# Patient Record
Sex: MALE | Race: WHITE | Employment: OTHER | URBAN - METROPOLITAN AREA
[De-identification: names, ages, dates, MRNs, and addresses within clinical notes are randomized per-mention and may not be internally consistent; named-entity substitution may affect disease eponyms.]

---

## 2017-01-09 ENCOUNTER — OFFICE VISIT (OUTPATIENT)
Dept: INTERNAL MEDICINE CLINIC | Age: 62
End: 2017-01-09

## 2017-01-09 VITALS
BODY MASS INDEX: 33.04 KG/M2 | HEART RATE: 97 BPM | DIASTOLIC BLOOD PRESSURE: 83 MMHG | SYSTOLIC BLOOD PRESSURE: 134 MMHG | RESPIRATION RATE: 18 BRPM | OXYGEN SATURATION: 97 % | HEIGHT: 71 IN | TEMPERATURE: 97.6 F | WEIGHT: 236 LBS

## 2017-01-09 DIAGNOSIS — I10 ESSENTIAL HYPERTENSION: Primary | ICD-10-CM

## 2017-01-09 DIAGNOSIS — K63.5 COLON POLYPS: ICD-10-CM

## 2017-01-09 DIAGNOSIS — Z23 ENCOUNTER FOR IMMUNIZATION: ICD-10-CM

## 2017-01-09 DIAGNOSIS — Z87.81 HISTORY OF FRACTURE OF RIGHT ANKLE: ICD-10-CM

## 2017-01-09 DIAGNOSIS — E66.9 OBESITY (BMI 30.0-34.9): ICD-10-CM

## 2017-01-09 DIAGNOSIS — F17.200 SMOKER: ICD-10-CM

## 2017-01-09 DIAGNOSIS — E78.00 HYPERCHOLESTEROLEMIA: ICD-10-CM

## 2017-01-09 DIAGNOSIS — E03.9 ACQUIRED HYPOTHYROIDISM: ICD-10-CM

## 2017-01-09 RX ORDER — FELODIPINE 5 MG/1
5 TABLET, EXTENDED RELEASE ORAL DAILY
Qty: 90 TAB | Refills: 3 | Status: SHIPPED | OUTPATIENT
Start: 2017-01-09 | End: 2017-12-22 | Stop reason: SDUPTHER

## 2017-01-09 RX ORDER — LEVOTHYROXINE SODIUM 150 UG/1
150 TABLET ORAL
Qty: 90 TAB | Refills: 3 | Status: SHIPPED | OUTPATIENT
Start: 2017-01-09 | End: 2017-12-22 | Stop reason: SDUPTHER

## 2017-01-09 RX ORDER — SIMVASTATIN 40 MG/1
40 TABLET, FILM COATED ORAL DAILY
Qty: 90 TAB | Refills: 3 | Status: SHIPPED | OUTPATIENT
Start: 2017-01-09 | End: 2017-12-22 | Stop reason: SDUPTHER

## 2017-01-09 NOTE — PROGRESS NOTES
Reviewed record in preparation for visit and have obtained necessary documentation. Identified pt with two pt identifiers(name and ). Health Maintenance Due   Topic    Hepatitis C Screening     Pneumococcal 19-64 Medium Risk (1 of 1 - PPSV23)    DTaP/Tdap/Td series (1 - Tdap)    FOBT Q 1 YEAR AGE 50-75     ZOSTER VACCINE AGE 60>     INFLUENZA AGE 9 TO ADULT          No chief complaint on file. Learning Assessment:  :     No flowsheet data found. Depression Screening:  :     PHQ 2 / 9, over the last two weeks 2017   Little interest or pleasure in doing things Not at all   Feeling down, depressed or hopeless Not at all   Total Score PHQ 2 0       Fall Risk Assessment:  :     No flowsheet data found. Abuse Screening:  :     No flowsheet data found. Coordination of Care Questionnaire:  :     1) Have you been to an emergency room, urgent care clinic since your last visit? NO   Hospitalized since your last visit? no             2) Have you seen or consulted any other health care providers outside of [address] [place] [address] since your last visit? no  (Include any pap smears or colon screenings in this section.)    3) Do you have an Advance Directive on file? no    4) Are you interested in receiving information on Advance Directives? YES      Patient is accompanied by patient I have received verbal consent from [facility] to discuss any/all medical information while they are present in the room.

## 2017-01-09 NOTE — LETTER
1/17/2017 12:03 PM 
 
Mr. Merly Maxwell [address] [id number] Dear Merly Maxwell: 
 
Please find your most recent results below. Resulted Orders CBC W/O DIFF Result Value Ref Range WBC 8.1 3.4 - 10.8 x10E3/uL  
 RBC 5.41 4.14 - 5.80 x10E6/uL HGB 16.6 12.6 - 17.7 g/dL HCT 48.1 37.5 - 51.0 % MCV 89 79 - 97 fL  
 MCH 30.7 26.6 - 33.0 pg  
 MCHC 34.5 31.5 - 35.7 g/dL  
 RDW 13.9 12.3 - 15.4 % PLATELET 195 911 - 465 x10E3/uL Narrative Performed at:  [address]  [id number] : Nathaniel Gar MD, Phone:  [phone] LIPID PANEL Result Value Ref Range Cholesterol, total 163 100 - 199 mg/dL Triglyceride 140 0 - 149 mg/dL HDL Cholesterol 37 (L) >39 mg/dL VLDL, calculated 28 5 - 40 mg/dL LDL, calculated 98 0 - 99 mg/dL Narrative Performed at:  [address]  [id number] : Nathaniel Gar MD, Phone:  [phone] METABOLIC PANEL, COMPREHENSIVE Result Value Ref Range Glucose 115 (H) 65 - 99 mg/dL BUN 15 8 - 27 mg/dL Creatinine 0.96 0.76 - 1.27 mg/dL GFR est non-AA 84 >59 mL/min/1.73 GFR est AA 97 >59 mL/min/1.73  
 BUN/Creatinine ratio 16 10 - 22 Sodium 141 134 - 144 mmol/L Potassium 4.7 3.5 - 5.2 mmol/L Chloride 102 96 - 106 mmol/L  
 CO2 21 18 - 29 mmol/L Calcium 9.2 8.6 - 10.2 mg/dL Protein, total 7.1 6.0 - 8.5 g/dL Albumin 4.6 3.6 - 4.8 g/dL GLOBULIN, TOTAL 2.5 1.5 - 4.5 g/dL A-G Ratio 1.8 1.1 - 2.5 Bilirubin, total 0.4 0.0 - 1.2 mg/dL Alk. phosphatase 60 39 - 117 IU/L  
 AST 21 0 - 40 IU/L  
 ALT 16 0 - 44 IU/L Narrative Performed at:  [address]  [id number] : Nathaniel Gar MD, Phone:  [phone] TSH 3RD GENERATION Result Value Ref Range TSH 0.771 0.450 - 4.500 uIU/mL Narrative Performed at:  [address]  [id number] : Kary Mosley MD, Phone:  [phone] PSA DIAGNOSTIC (PROSTATIC SPECIFIC AG) Result Value Ref Range Prostate Specific Ag 1.0 0.0 - 4.0 ng/mL Comment:  
   Roche ECLIA methodology. According to the American Urological Association, Serum PSA should 
decrease and remain at undetectable levels after radical 
prostatectomy. The AUA defines biochemical recurrence as an initial 
PSA value 0.2 ng/mL or greater followed by a subsequent confirmatory PSA value 0.2 ng/mL or greater. Values obtained with different assay methods or kits cannot be used 
interchangeably. Results cannot be interpreted as absolute evidence 
of the presence or absence of malignant disease. Narrative Performed at:  [address]  [id number] : Kary Mosley MD, Phone:  [phone] CVD REPORT Result Value Ref Range INTERPRETATION Note Comment:  
   Supplement report is available. Narrative Performed at:  [address] [place] [address] [address] [place] [address]  [id number] : Diallo Skelton PhD, Phone:  [phone] RECOMMENDATIONS: 
 
 
High blood sugar -- watch sweets/carbs/starchy food Recheck Fasting blood sugar and A1c Otherwise ok  
    
   
 
 
 
 
Please call me if you have any questions: [phone] Sincerely, 
 
 
Balaji Pacheco, DO

## 2017-01-09 NOTE — MR AVS SNAPSHOT
Visit Information Date & Time Provider Department Dept. Phone Encounter #  
 1/9/2017  9:00 AM Tank Kennedy, [address] [facility] Internal Medicine [phone] [id number] Follow-up Instructions Return in about 6 months (around 7/9/2017). Upcoming Health Maintenance Date Due Hepatitis C Screening 1955 Pneumococcal 19-64 Medium Risk (1 of 1 - PPSV23) 5/26/1972 DTaP/Tdap/Td series (1 - Tdap) 5/26/1974 FOBT Q 1 YEAR AGE 50-75 5/26/2003 ZOSTER VACCINE AGE 60> 5/26/2013 INFLUENZA AGE 9 TO ADULT 8/1/2016 Allergies as of 1/9/2017  Review Complete On: 1/9/2017 By: Tank Kennedy, DO No Known Allergies Current Immunizations  Reviewed on 1/9/2017 Name Date Influenza Vaccine 10/15/2016 Reviewed by Gracie Sims on 1/9/2017 at  8:57 AM  
You Were Diagnosed With   
  
 Codes Comments Essential hypertension    -  Primary ICD-10-CM: I10 
ICD-9-CM: 401.9 Hypercholesterolemia     ICD-10-CM: E78.00 ICD-9-CM: 272.0 Acquired hypothyroidism     ICD-10-CM: E03.9 ICD-9-CM: 244.9 Obesity (BMI 30.0-34.9)     ICD-10-CM: J64.3 ICD-9-CM: 278.00 Colon polyps     ICD-10-CM: K63.5 ICD-9-CM: 211.3 Smoker     ICD-10-CM: L35.191 ICD-9-CM: 305.1 History of fracture of right ankle     ICD-10-CM: Z87.81 ICD-9-CM: V15.51 Encounter for immunization     ICD-10-CM: A83 ICD-9-CM: V03.89 Vitals BP Pulse Temp Resp Height(growth percentile) Weight(growth percentile) 134/83 (BP 1 Location: Right arm, BP Patient Position: Sitting) 97 97.6 °F (36.4 °C) (Oral) 18 5' 11\" (1.803 m) 236 lb (107 kg) SpO2 BMI Smoking Status 97% 32.92 kg/m2 Current Every Day Smoker Vitals History BMI and BSA Data Body Mass Index Body Surface Area  
 32.92 kg/m 2 2.32 m 2 Preferred Pharmacy Pharmacy Name Phone [address] [facility] [phone] Your Updated Medication List  
  
   
This list is accurate as of: 1/9/17  9:26 AM.  Always use your most recent med list.  
  
  
  
  
 felodipine 5 mg 24 hr tablet Commonly known as:  PLENDIL SR Take 1 Tab by mouth daily. Felodipine ER. Takes one po daily  
  
 levothyroxine 150 mcg tablet Commonly known as:  SYNTHROID Take 1 Tab by mouth Daily (before breakfast). simvastatin 40 mg tablet Commonly known as:  ZOCOR Take 1 Tab by mouth daily. Prescriptions Sent to Pharmacy Refills  
 simvastatin (ZOCOR) 40 mg tablet 3 Sig: Take 1 Tab by mouth daily. Class: Normal  
 Pharmacy: [address], [address] Ph #: [phone] Route: Oral  
 felodipine (PLENDIL SR) 5 mg 24 hr tablet 3 Sig: Take 1 Tab by mouth daily. Felodipine ER. Takes one po daily Class: Normal  
 Pharmacy: [address], [address] Ph #: [phone] Route: Oral  
 levothyroxine (SYNTHROID) 150 mcg tablet 3 Sig: Take 1 Tab by mouth Daily (before breakfast). Class: Normal  
 Pharmacy: [address], [address] Ph #: [phone] Route: Oral  
  
We Performed the Following CBC W/O DIFF [53483 CPT(R)] LIPID PANEL [33056 CPT(R)] METABOLIC PANEL, COMPREHENSIVE [04721 CPT(R)] PSA DIAGNOSTIC (PROSTATIC SPECIFIC AG) [id number] CPT(R)] TSH 3RD GENERATION [45472 CPT(R)] Follow-up Instructions Return in about 6 months (around 7/9/2017). Introducing [facility] & HEALTH SERVICES! [employer] introduces iCAD patient portal. Now you can access parts of your medical record, email your doctor's office, and request medication refills online. 1. In your internet browser, go to [email or web address] 2. Click on the First Time User? Click Here link in the Sign In box. You will see the New Member Sign Up page. 3. Enter your [employer] Access Code exactly as it appears below. You will not need to use this code after youve completed the sign-up process. If you do not sign up before the expiration date, you must request a new code. · [employer] Access Code: [email or web address] Expires: 4/9/2017  9:14 AM 
 
4. Enter the last four digits of your Social Security Number (xxxx) and Date of Birth (mm/dd/yyyy) as indicated and click Submit. You will be taken to the next sign-up page. 5. Create a [employer] ID. This will be your [employer] login ID and cannot be changed, so think of one that is secure and easy to remember. 6. Create a [employer] password. You can change your password at any time. 7. Enter your Password Reset Question and Answer. This can be used at a later time if you forget your password. 8. Enter your e-mail address. You will receive e-mail notification when new information is available in [address]. 9. Click Sign Up. You can now view and download portions of your medical record. 10. Click the Download Summary menu link to download a portable copy of your medical information. If you have questions, please visit the Frequently Asked Questions section of the [employer] website. Remember, [employer] is NOT to be used for urgent needs. For medical emergencies, dial 911. Now available from your iPhone and Android! Please provide this summary of care documentation to your next provider. Your primary care clinician is listed as Ifeoma Paula. If you have any questions after today's visit, please call [phone].

## 2017-01-09 NOTE — PROGRESS NOTES
HISTORY OF PRESENT ILLNESS  Chris Quiros is a 59 y.o. male. New pt. Has a few chronic medical problems and on a few meds as documented and reviewed with pt. Overall feels ok. Has chronic R ankle pain from an old fracture. Reports compliance with meds. Tries to be active physically but has gained wt. Recent colonoscopy showed 5 benign polyps. Smokes 1 PPD. Social ETOH. . 1 grown child. Due for labs. Needs med refills. No other new c/o. Establish Care   Pertinent negatives include no chest pain, no abdominal pain, no headaches and no shortness of breath. Hypertension    Pertinent negatives include no chest pain, no malaise/fatigue, no blurred vision, no headaches, no dizziness and no shortness of breath. Cholesterol Problem   Pertinent negatives include no chest pain, no abdominal pain, no headaches and no shortness of breath. Review of Systems   Constitutional: Negative for fever, malaise/fatigue and weight loss. HENT: Negative for congestion. Eyes: Negative for blurred vision. Respiratory: Negative for cough and shortness of breath. Cardiovascular: Negative for chest pain and leg swelling. Gastrointestinal: Negative for abdominal pain and heartburn. Genitourinary: Negative for dysuria and frequency. Musculoskeletal: Negative for back pain and joint pain. Skin: Negative for rash. Neurological: Negative for dizziness, sensory change and headaches. Psychiatric/Behavioral: Negative for depression. The patient is not nervous/anxious and does not have insomnia. All other systems reviewed and are negative. Physical Exam   Constitutional: He is oriented to person, place, and time. He appears well-developed and well-nourished. No distress. Pleasant  Obese     HENT:   Head: Normocephalic and atraumatic. Mouth/Throat: Oropharynx is clear and moist.   Eyes: Conjunctivae and EOM are normal. Pupils are equal, round, and reactive to light. No scleral icterus.    Neck: Normal range of motion. Neck supple. No JVD present. No thyromegaly present. Cardiovascular: Normal rate, regular rhythm, normal heart sounds and intact distal pulses. No murmur heard. Pulmonary/Chest: Effort normal and breath sounds normal. No respiratory distress. He has no wheezes. He has no rales. Abdominal: Soft. Bowel sounds are normal. He exhibits no distension. There is no tenderness. obese   Musculoskeletal: He exhibits no edema or tenderness. Lymphadenopathy:     He has no cervical adenopathy. Neurological: He is alert and oriented to person, place, and time. No cranial nerve deficit. Coordination normal.   Skin: Skin is warm and dry. No rash noted. Psychiatric: He has a normal mood and affect. His behavior is normal.   Nursing note and vitals reviewed. ASSESSMENT and PLAN  Dave Kennedy was seen today for establish care, hypertension, hypothyroidism and cholesterol problem. Diagnoses and all orders for this visit:    Essential hypertension  -     CBC W/O DIFF  -     METABOLIC PANEL, COMPREHENSIVE  -     PSA - PROSTATE SPECIFIC AG    Hypercholesterolemia  -     LIPID PANEL  -     METABOLIC PANEL, COMPREHENSIVE    Acquired hypothyroidism  -     TSH 3RD GENERATION  -     PSA - PROSTATE SPECIFIC AG    Obesity (BMI 30.0-34. 9)    Colon polyps    Smoker    History of fracture of right ankle    Encounter for immunization  -     PNEUMOCOCCAL POLYSACCHARIDE VACCINE, 23-VALENT, ADULT OR IMMUNOSUPPRESSED PT DOSE,    Other orders  -     simvastatin (ZOCOR) 40 mg tablet; Take 1 Tab by mouth daily. -     felodipine (PLENDIL SR) 5 mg 24 hr tablet; Take 1 Tab by mouth daily. Felodipine ER. Takes one po daily  -     levothyroxine (SYNTHROID) 150 mcg tablet; Take 1 Tab by mouth Daily (before breakfast). Follow-up Disposition:  Return in about 6 months (around 7/9/2017).   lab results and schedule of future lab studies reviewed with patient  reviewed diet, exercise and weight control  very strongly urged to quit smoking to reduce cardiovascular risk  cardiovascular risk and specific lipid/LDL goals reviewed  reviewed medications and side effects in detail  use of aspirin to prevent MI and TIA's discussed  Overall stable

## 2017-01-14 LAB
ALBUMIN SERPL-MCNC: 4.6 G/DL (ref 3.6–4.8)
ALBUMIN/GLOB SERPL: 1.8 {RATIO} (ref 1.1–2.5)
ALP SERPL-CCNC: 60 IU/L (ref 39–117)
ALT SERPL-CCNC: 16 IU/L (ref 0–44)
AST SERPL-CCNC: 21 IU/L (ref 0–40)
BILIRUB SERPL-MCNC: 0.4 MG/DL (ref 0–1.2)
BUN SERPL-MCNC: 15 MG/DL (ref 8–27)
BUN/CREAT SERPL: 16 (ref 10–22)
CALCIUM SERPL-MCNC: 9.2 MG/DL (ref 8.6–10.2)
CHLORIDE SERPL-SCNC: 102 MMOL/L (ref 96–106)
CHOLEST SERPL-MCNC: 163 MG/DL (ref 100–199)
CO2 SERPL-SCNC: 21 MMOL/L (ref 18–29)
CREAT SERPL-MCNC: 0.96 MG/DL (ref 0.76–1.27)
ERYTHROCYTE [DISTWIDTH] IN BLOOD BY AUTOMATED COUNT: 13.9 % (ref 12.3–15.4)
GLOBULIN SER CALC-MCNC: 2.5 G/DL (ref 1.5–4.5)
GLUCOSE SERPL-MCNC: 115 MG/DL (ref 65–99)
HCT VFR BLD AUTO: 48.1 % (ref 37.5–51)
HDLC SERPL-MCNC: 37 MG/DL
HGB BLD-MCNC: 16.6 G/DL (ref 12.6–17.7)
INTERPRETATION, 910389: NORMAL
LDLC SERPL CALC-MCNC: 98 MG/DL (ref 0–99)
MCH RBC QN AUTO: 30.7 PG (ref 26.6–33)
MCHC RBC AUTO-ENTMCNC: 34.5 G/DL (ref 31.5–35.7)
MCV RBC AUTO: 89 FL (ref 79–97)
PLATELET # BLD AUTO: 213 X10E3/UL (ref 150–379)
POTASSIUM SERPL-SCNC: 4.7 MMOL/L (ref 3.5–5.2)
PROT SERPL-MCNC: 7.1 G/DL (ref 6–8.5)
PSA SERPL-MCNC: 1 NG/ML (ref 0–4)
RBC # BLD AUTO: 5.41 X10E6/UL (ref 4.14–5.8)
SODIUM SERPL-SCNC: 141 MMOL/L (ref 134–144)
TRIGL SERPL-MCNC: 140 MG/DL (ref 0–149)
TSH SERPL DL<=0.005 MIU/L-ACNC: 0.77 UIU/ML (ref 0.45–4.5)
VLDLC SERPL CALC-MCNC: 28 MG/DL (ref 5–40)
WBC # BLD AUTO: 8.1 X10E3/UL (ref 3.4–10.8)

## 2017-03-15 ENCOUNTER — OFFICE VISIT (OUTPATIENT)
Dept: INTERNAL MEDICINE CLINIC | Age: 62
End: 2017-03-15

## 2017-03-15 VITALS
HEIGHT: 71 IN | OXYGEN SATURATION: 95 % | DIASTOLIC BLOOD PRESSURE: 85 MMHG | HEART RATE: 78 BPM | BODY MASS INDEX: 31.36 KG/M2 | SYSTOLIC BLOOD PRESSURE: 126 MMHG | WEIGHT: 224 LBS | RESPIRATION RATE: 20 BRPM | TEMPERATURE: 97 F

## 2017-03-15 DIAGNOSIS — C44.91 BASAL CELL CARCINOMA: Primary | ICD-10-CM

## 2017-03-15 DIAGNOSIS — Z01.818 PRE-OP EXAM: ICD-10-CM

## 2017-03-15 NOTE — MR AVS SNAPSHOT
Visit Information Date & Time Provider Department Dept. Phone Encounter #  
 3/15/2017  1:15 PM Mayra Means, [address] [facility] Internal Medicine [phone] [id number] Your Appointments 7/10/2017  8:30 AM  
ROUTINE CARE with Ashely Hilton DO [facility] Internal Medicine ([facility]) Appt Note: fu 6m [address] [facility] [address] [address]  
[phone]  
  
   
 [address] Upcoming Health Maintenance Date Due Hepatitis C Screening 1955 DTaP/Tdap/Td series (1 - Tdap) 5/26/1974 FOBT Q 1 YEAR AGE 50-75 5/26/2003 ZOSTER VACCINE AGE 60> 5/26/2013 Allergies as of 3/15/2017  Review Complete On: 3/15/2017 By: Mayra Means NP No Known Allergies Current Immunizations  Reviewed on 1/9/2017 Name Date Influenza Vaccine 10/15/2016 Pneumococcal Polysaccharide (PPSV-23) 1/9/2017 Not reviewed this visit You Were Diagnosed With   
  
 Codes Comments Pre-op testing    -  Primary ICD-10-CM: G66.338 ICD-9-CM: V72.84 Vitals BP Pulse Temp Resp Height(growth percentile) Weight(growth percentile) 126/85 (BP 1 Location: Right arm, BP Patient Position: Sitting) 78 97 °F (36.1 °C) (Oral) 20 5' 11\" (1.803 m) 224 lb (101.6 kg) SpO2 BMI Smoking Status 95% 31.24 kg/m2 Current Every Day Smoker Vitals History BMI and BSA Data Body Mass Index Body Surface Area  
 31.24 kg/m 2 2.26 m 2 Preferred Pharmacy Pharmacy Name Phone [address] [facility] [phone] Your Updated Medication List  
  
   
This list is accurate as of: 3/15/17  2:15 PM.  Always use your most recent med list.  
  
  
  
  
 felodipine 5 mg 24 hr tablet Commonly known as:  PLENDIL SR Take 1 Tab by mouth daily. Felodipine ER. Takes one po daily  
  
 levothyroxine 150 mcg tablet Commonly known as:  SYNTHROID  
 Take 1 Tab by mouth Daily (before breakfast). simvastatin 40 mg tablet Commonly known as:  ZOCOR Take 1 Tab by mouth daily. We Performed the Following AMB POC EKG ROUTINE W/ 12 LEADS, INTER & REP [28356 CPT(R)] Patient Instructions Electrocardiogram (EKG, ECG): About This Test 
What is it? An electrocardiogram (EKG or ECG) is a test that checks for problems with the electrical activity of your heart. An EKG translates the heart's electrical activity into line tracings on paper. Why is this test done? You may need this test to check your heart's electrical activity. The test also can check the health of your heart. For example, it can help find the cause of unexplained chest pain or pressure, or other symptoms of heart disease. How can you prepare for the test? 
· Tell your doctor about all the nonprescription and prescription medicines you take. Many medicines may change the results of this test. 
What happens during the test? 
· You may have to remove certain jewelry. · You will take your top off and be given a gown to wear. · You will lie on a bed or table. Parts of your arms, legs, and chest will be cleaned and may be shaved. · Small pads or patches (electrodes) will be attached to your skin on each arm and leg and on your chest. A special paste or pad may go between the electrode and your skin. The electrodes are hooked to a machine that traces your heart activity onto a paper. · During the test, lie very still and breathe normally. Do not talk during the test. 
What else should you know about the test? 
· An EKG is a completely safe test. No electricity passes through your body from the machine, and there is no danger of getting an electrical shock. How long does the test take? · The test usually takes 5 to 10 minutes. What happens after the test? 
· You will probably be able to go home right away. · You can go back to your usual activities right away. When should you call for help? Watch closely for changes in your health, and be sure to contact your doctor if you have any problems. Follow-up care is a key part of your treatment and safety. Be sure to make and go to all appointments, and call your doctor if you are having problems. It's also a good idea to keep a list of the medicines you take. Ask your doctor when you can expect to have your test results. Where can you learn more? Go to http://ignacio-bonilla.info/. Enter E180 in the search box to learn more about \"Electrocardiogram (EKG, ECG): About This Test.\" Current as of: January 27, 2016 Content Version: 11.1 © 7449-3367 [employer]. Care instructions adapted under license by iRewardChart (which disclaims liability or warranty for this information). If you have questions about a medical condition or this instruction, always ask your healthcare professional. [facility] any warranty or liability for your use of this information. Introducing [facility] & HE[facility] SERVICES! Dear Italia Given: 
Thank you for requesting a Ingenic account. Our records indicate that you already have an active Ingenic account. You can access your account anytime at [email or web address] Did you know that you can access your hospital and ER discharge instructions at any time in Ingenic? You can also review all of your test results from your hospital stay or ER visit. Additional Information If you have questions, please visit the Frequently Asked Questions section of the Ingenic website at [email or web address] Remember, Ingenic is NOT to be used for urgent needs. For medical emergencies, dial 911. Now available from your iPhone and Android! Please provide this summary of care documentation to your next provider. Your primary care clinician is listed as Lona Fuentes.  If you have any questions after today's visit, please call [phone].

## 2017-03-15 NOTE — PATIENT INSTRUCTIONS
Electrocardiogram (EKG, ECG): About This Test  What is it? An electrocardiogram (EKG or ECG) is a test that checks for problems with the electrical activity of your heart. An EKG translates the heart's electrical activity into line tracings on paper. Why is this test done? You may need this test to check your heart's electrical activity. The test also can check the health of your heart. For example, it can help find the cause of unexplained chest pain or pressure, or other symptoms of heart disease. How can you prepare for the test?  · Tell your doctor about all the nonprescription and prescription medicines you take. Many medicines may change the results of this test.  What happens during the test?  · You may have to remove certain jewelry. · You will take your top off and be given a gown to wear. · You will lie on a bed or table. Parts of your arms, legs, and chest will be cleaned and may be shaved. · Small pads or patches (electrodes) will be attached to your skin on each arm and leg and on your chest. A special paste or pad may go between the electrode and your skin. The electrodes are hooked to a machine that traces your heart activity onto a paper. · During the test, lie very still and breathe normally. Do not talk during the test.  What else should you know about the test?  · An EKG is a completely safe test. No electricity passes through your body from the machine, and there is no danger of getting an electrical shock. How long does the test take? · The test usually takes 5 to 10 minutes. What happens after the test?  · You will probably be able to go home right away. · You can go back to your usual activities right away. When should you call for help? Watch closely for changes in your health, and be sure to contact your doctor if you have any problems. Follow-up care is a key part of your treatment and safety.  Be sure to make and go to all appointments, and call your doctor if you are having problems. It's also a good idea to keep a list of the medicines you take. Ask your doctor when you can expect to have your test results. Where can you learn more? Go to http://ignacio-bonilla.info/. Enter E180 in the search box to learn more about \"Electrocardiogram (EKG, ECG): About This Test.\"  Current as of: January 27, 2016  Content Version: 11.1  © 0141-9913 [employer]. Care instructions adapted under license by [employer]Learning (which disclaims liability or warranty for this information). If you have questions about a medical condition or this instruction, always ask your healthcare professional. [address] any warranty or liability for your use of this information.

## 2017-03-15 NOTE — PROGRESS NOTES
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 Reviewed record  In preparation for visit and have obtained necessary documentation. 1. Have you been to the ER, urgent care clinic since your last visit? Hospitalized since your last visit? No      2. Have you seen or consulted any other health care providers outside of the [address] [place] [address] since your last visit? Include any pap smears or colon screening. Dr. Jillian Nuñez surgery      Surgery / [place]--03/21/2017.   Used 2 patient I. D.'s

## 2017-03-15 NOTE — PROGRESS NOTES
HISTORY OF PRESENT ILLNESS  Lucia  is a 59 y.o. male. This is a patient of Dr. Yvonne Carmen who presents today for pre-op exam.  The patient is scheduled to have removal of 3 basal cell carcinomas on 3/21/17 by Dr. Domenic Manzano. The patient is generally feeling well at the time of today's visit. He denies chest pain, shortness of breath, dizziness, and GI/ problems. Visit Vitals    /85 (BP 1 Location: Right arm, BP Patient Position: Sitting)    Pulse 78    Temp 97 °F (36.1 °C) (Oral)    Resp 20    Ht 5' 11\" (1.803 m)    Wt 224 lb (101.6 kg)    SpO2 95%    BMI 31.24 kg/m2     HPI    Review of Systems   Constitutional: Negative for chills, fever, malaise/fatigue and weight loss. HENT: Negative for congestion. Eyes: Negative for blurred vision. Respiratory: Negative for cough, shortness of breath and wheezing. Cardiovascular: Negative for chest pain, palpitations and leg swelling. Gastrointestinal: Negative for abdominal pain. Genitourinary: Negative. Musculoskeletal: Negative. Skin: Negative. Basal cell carcinoma of face x 3   Neurological: Negative for dizziness, tingling, tremors, weakness and headaches. Endo/Heme/Allergies: Negative. Psychiatric/Behavioral: Negative. Physical Exam   Constitutional: He is oriented to person, place, and time. He appears well-developed and well-nourished. No distress. HENT:   Head: Normocephalic and atraumatic. Eyes: Conjunctivae are normal.   Neck: Neck supple. Cardiovascular: Normal rate, regular rhythm, normal heart sounds and intact distal pulses. No murmur heard. Pulmonary/Chest: Effort normal and breath sounds normal. No respiratory distress. He has no wheezes. He has no rales. Abdominal: Soft. Bowel sounds are normal. He exhibits no distension. There is no tenderness. There is no rebound and no guarding. Musculoskeletal: He exhibits no edema. Lymphadenopathy:     He has no cervical adenopathy. Neurological: He is alert and oriented to person, place, and time. Skin: Skin is warm and dry. Psychiatric: He has a normal mood and affect. His behavior is normal.   Nursing note and vitals reviewed. ASSESSMENT and PLAN    ICD-10-CM ICD-9-CM    1. Basal cell carcinoma C44.91 173.91 Continue to follow with Dr. Juancarlos Joyner. Completed pre-op H&P form. 2. Pre-op exam Z01.818 V72.84 In office,   AMB POC EKG ROUTINE W/ 12 LEADS, INTER & REP- sinus rhythm- reviewed by Dr. Amos Medeiros, as well- stable. Lab results and schedule of future lab studies reviewed with patient    Reviewed medications and side effects in detail    Reviewed plan of care with patient who acknowledges understanding and agrees.

## 2017-07-10 ENCOUNTER — OFFICE VISIT (OUTPATIENT)
Dept: INTERNAL MEDICINE CLINIC | Age: 62
End: 2017-07-10

## 2017-07-10 VITALS
RESPIRATION RATE: 18 BRPM | BODY MASS INDEX: 32.81 KG/M2 | TEMPERATURE: 96.8 F | WEIGHT: 234.4 LBS | HEIGHT: 71 IN | SYSTOLIC BLOOD PRESSURE: 133 MMHG | OXYGEN SATURATION: 95 % | DIASTOLIC BLOOD PRESSURE: 82 MMHG | HEART RATE: 80 BPM

## 2017-07-10 DIAGNOSIS — E66.9 OBESITY (BMI 30.0-34.9): ICD-10-CM

## 2017-07-10 DIAGNOSIS — I10 ESSENTIAL HYPERTENSION: Primary | ICD-10-CM

## 2017-07-10 DIAGNOSIS — F17.200 SMOKER: ICD-10-CM

## 2017-07-10 DIAGNOSIS — E78.00 HYPERCHOLESTEROLEMIA: ICD-10-CM

## 2017-07-10 DIAGNOSIS — L82.1 SEBORRHEIC KERATOSES: ICD-10-CM

## 2017-07-10 DIAGNOSIS — E03.9 ACQUIRED HYPOTHYROIDISM: ICD-10-CM

## 2017-07-10 DIAGNOSIS — L98.9 NON-HEALING SKIN LESION: ICD-10-CM

## 2017-07-10 NOTE — PROGRESS NOTES
Room 4    Chief Complaint   Patient presents with    Hypertension     6 month follow up     1. Have you been to the ER, urgent care clinic since your last visit? Hospitalized since your last visit? No    2. Have you seen or consulted any other health care providers outside of the [address] [place] [address] since your last visit? Include any pap smears or colon screening.  Yes When: March 2017 Dr Xu Yi dx: basal cell carcinoma

## 2017-07-10 NOTE — LETTER
7/31/2017 11:05 AM 
 
Mr. Martin Bass 63 [facility] 7 [address] Dear Martin Bass: 
 
Please find your most recent results below. Resulted Orders LIPID PANEL Result Value Ref Range Cholesterol, total 151 100 - 199 mg/dL Triglyceride 163 (H) 0 - 149 mg/dL HDL Cholesterol 42 >39 mg/dL VLDL, calculated 33 5 - 40 mg/dL LDL, calculated 76 0 - 99 mg/dL Narrative Performed at:  [address]  [id number] : Taimca Christopher MD, Phone:  [phone] METABOLIC PANEL, BASIC Result Value Ref Range Glucose 103 (H) 65 - 99 mg/dL BUN 10 8 - 27 mg/dL Creatinine 0.88 0.76 - 1.27 mg/dL GFR est non-AA 91 >59 mL/min/1.73 GFR est  >59 mL/min/1.73  
 BUN/Creatinine ratio 11 10 - 24 Sodium 142 134 - 144 mmol/L Potassium 4.7 3.5 - 5.2 mmol/L Chloride 102 96 - 106 mmol/L  
 CO2 24 18 - 29 mmol/L Calcium 9.2 8.6 - 10.2 mg/dL Narrative Performed at:  [address]  [id number] : Tamica Christopher MD, Phone:  [phone] ALT Result Value Ref Range ALT (SGPT) 14 0 - 44 IU/L Narrative Performed at:  [address]  [id number] : Tamica Christopher MD, Phone:  [phone] AST Result Value Ref Range AST (SGOT) 19 0 - 40 IU/L Narrative Performed at:  [address]  [id number] : Tamica Christopher MD, Phone:  [phone] TSH 3RD GENERATION Result Value Ref Range TSH 0.909 0.450 - 4.500 uIU/mL Narrative Performed at:  [address]  [id number] : Tamica Christopher MD, Phone:  [phone] CVD REPORT Result Value Ref Range INTERPRETATION Note Comment:  
   Supplement report is available. Narrative Performed at:  [address] [place] [address]  [id number] : Boom Love PhD, Phone:  [phone] RECOMMENDATIONS: 
 
Triglycerides slightly elevated. watch diet for fatty foods and sweets and exercise as tolerated. Other labs stable. Please call me if you have any questions: [phone] Sincerely, 
 
 
Annamarie Lucio, DO

## 2017-07-10 NOTE — PROGRESS NOTES
HISTORY OF PRESENT ILLNESS  Shante Short is a 57 y.o. male. Pt. comes in for f/u. Has multiple medical problems. Reports a nonhealing left forehead lesion. Also concerned about a few spots on his back. Has history of skin cancer. Continues to smoke a pack daily. Denies alcohol use. No chest pain or shortness of breath. Reports compliance with medications and diet. Med list and most recent labs/studies reviewed with pt. Trying to be active physically but has gained more weight. Due for repeat labs. Reports no other new c/o. Hypertension    Pertinent negatives include no chest pain, no blurred vision, no headaches, no dizziness and no shortness of breath. Skin Problem   Pertinent negatives include no chest pain, no abdominal pain, no headaches and no shortness of breath. Review of Systems   Constitutional: Negative. Eyes: Negative for blurred vision. Respiratory: Negative for cough and shortness of breath. Cardiovascular: Negative for chest pain and leg swelling. Gastrointestinal: Negative for abdominal pain and heartburn. Genitourinary: Negative for dysuria. Musculoskeletal: Negative for back pain and joint pain. Skin: Negative for rash. Neurological: Negative for dizziness, sensory change, focal weakness and headaches. Psychiatric/Behavioral: Negative for depression. The patient is not nervous/anxious and does not have insomnia. All other systems reviewed and are negative. Physical Exam   Constitutional: He is oriented to person, place, and time. He appears well-developed and well-nourished. No distress. Pleasant  Obese     HENT:   Head: Normocephalic and atraumatic. Mouth/Throat: Oropharynx is clear and moist.   Eyes: Conjunctivae are normal. No scleral icterus. Neck: Normal range of motion. Neck supple. No JVD present. No thyromegaly present. Cardiovascular: Normal rate, regular rhythm, normal heart sounds and intact distal pulses.     No murmur heard.  Pulmonary/Chest: Effort normal and breath sounds normal. No respiratory distress. He has no wheezes. He has no rales. Abdominal: Soft. Bowel sounds are normal. He exhibits no distension. obese   Musculoskeletal: He exhibits no edema or tenderness. Neurological: He is alert and oriented to person, place, and time. Coordination normal.   Skin: Skin is warm and dry. No rash noted. Small red nonhealing lesion of left forehead  Multiple seborrheic keratosis spots on his back   Psychiatric: He has a normal mood and affect. His behavior is normal.   Nursing note and vitals reviewed. ASSESSMENT and PLAN  Alena Angelucci was seen today for hypertension and skin problem. Diagnoses and all orders for this visit:    Essential hypertension  -     METABOLIC PANEL, BASIC    Hypercholesterolemia  -     LIPID PANEL  -     METABOLIC PANEL, BASIC  -     ALT  -     AST    Acquired hypothyroidism  -     TSH 3RD GENERATION    Obesity (BMI 30.0-34. 9)    Smoker    Non-healing skin lesion, L forehead  -     REFERRAL TO DERMATOLOGY    Seborrheic keratoses      Follow-up Disposition:  Return in about 6 months (around 1/10/2018).    lab results and schedule of future lab studies reviewed with patient  reviewed diet, exercise and weight control  reviewed medications and side effects in detail  Advised him to stop smoking  Reassurance about spots on his back  Referred to dermatology for nonhealing forehead lesion  Advised him to lose weight

## 2017-07-10 NOTE — MR AVS SNAPSHOT
Visit Information Date & Time Provider Department Dept. Phone Encounter #  
 7/10/2017  8:30 AM Hanny Brennan, [address] [place] [address] Internal Medicine [phone] [id number] Follow-up Instructions Return in about 6 months (around 1/10/2018). Upcoming Health Maintenance Date Due Hepatitis C Screening 1955 DTaP/Tdap/Td series (1 - Tdap) 5/26/1974 FOBT Q 1 YEAR AGE 50-75 5/26/2003 ZOSTER VACCINE AGE 60> 5/26/2013 INFLUENZA AGE 9 TO ADULT 8/1/2017 Allergies as of 7/10/2017  Review Complete On: 7/10/2017 By: Hanny Brennan, DO No Known Allergies Current Immunizations  Reviewed on 1/9/2017 Name Date Influenza Vaccine 10/15/2016 Pneumococcal Polysaccharide (PPSV-23) 1/9/2017 Not reviewed this visit You Were Diagnosed With   
  
 Codes Comments Essential hypertension    -  Primary ICD-10-CM: I10 
ICD-9-CM: 401.9 Hypercholesterolemia     ICD-10-CM: E78.00 ICD-9-CM: 272.0 Acquired hypothyroidism     ICD-10-CM: E03.9 ICD-9-CM: 244.9 Obesity (BMI 30.0-34.9)     ICD-10-CM: E91.2 ICD-9-CM: 278.00 Smoker     ICD-10-CM: A74.592 ICD-9-CM: 305.1 Non-healing skin lesion     ICD-10-CM: L98.9 ICD-9-CM: 709.9 Vitals BP Pulse Temp Resp Height(growth percentile) Weight(growth percentile) 133/82 (BP 1 Location: Right arm, BP Patient Position: Sitting) 80 96.8 °F (36 °C) (Oral) 18 5' 11\" (1.803 m) 234 lb 6.4 oz (106.3 kg) SpO2 BMI Smoking Status 95% 32.69 kg/m2 Current Every Day Smoker Vitals History BMI and BSA Data Body Mass Index Body Surface Area  
 32.69 kg/m 2 2.31 m 2 Preferred Pharmacy Pharmacy Name Phone Radha Gallardo, [facility] [phone] Your Updated Medication List  
  
   
This list is accurate as of: 7/10/17  8:55 AM.  Always use your most recent med list.  
  
  
  
  
 felodipine 5 mg 24 hr tablet Commonly known as:  PLENDIL SR Take 1 Tab by mouth daily. Felodipine ER. Takes one po daily  
  
 levothyroxine 150 mcg tablet Commonly known as:  SYNTHROID Take 1 Tab by mouth Daily (before breakfast). simvastatin 40 mg tablet Commonly known as:  ZOCOR Take 1 Tab by mouth daily. We Performed the Following ALT [id number] CPT(R)] AST H6496609 CPT(R)] LIPID PANEL [94613 CPT(R)] METABOLIC PANEL, BASIC [08352 CPT(R)] REFERRAL TO DERMATOLOGY [REF19 Custom] Comments:  
 Please evaluate patient for non-healing lesion of forehead. TSH 3RD GENERATION [00218 CPT(R)] Follow-up Instructions Return in about 6 months (around 1/10/2018). Referral Information Referral ID Referred By Referred To  
  
 [id number] Iwona Rendon MD   
   [place] [address] [address] Phone: [phone] Fax: [phone] Visits Status Start Date End Date 1 New Request 7/10/17 7/10/18 If your referral has a status of pending review or denied, additional information will be sent to support the outcome of this decision. Introducing [facility]! Dear Beverly Velez: 
Thank you for requesting a [employer] account. Our records indicate that you already have an active [employer] account. You can access your account anytime at [email or web address] Did you know that you can access your hospital and ER discharge instructions at any time in [employer]? You can also review all of your test results from your hospital stay or ER visit. Additional Information If you have questions, please visit the Frequently Asked Questions section of the [employer] website at [email or web address] Remember, [employer] is NOT to be used for urgent needs. For medical emergencies, dial 911. Now available from your iPhone and Android! Please provide this summary of care documentation to your next provider. Your primary care clinician is listed as Miguel Loges. If you have any questions after today's visit, please call [phone].

## 2017-07-29 LAB
ALT SERPL-CCNC: 14 IU/L (ref 0–44)
AST SERPL-CCNC: 19 IU/L (ref 0–40)
BUN SERPL-MCNC: 10 MG/DL (ref 8–27)
BUN/CREAT SERPL: 11 (ref 10–24)
CALCIUM SERPL-MCNC: 9.2 MG/DL (ref 8.6–10.2)
CHLORIDE SERPL-SCNC: 102 MMOL/L (ref 96–106)
CHOLEST SERPL-MCNC: 151 MG/DL (ref 100–199)
CO2 SERPL-SCNC: 24 MMOL/L (ref 18–29)
CREAT SERPL-MCNC: 0.88 MG/DL (ref 0.76–1.27)
GLUCOSE SERPL-MCNC: 103 MG/DL (ref 65–99)
HDLC SERPL-MCNC: 42 MG/DL
INTERPRETATION, 910389: NORMAL
LDLC SERPL CALC-MCNC: 76 MG/DL (ref 0–99)
POTASSIUM SERPL-SCNC: 4.7 MMOL/L (ref 3.5–5.2)
SODIUM SERPL-SCNC: 142 MMOL/L (ref 134–144)
TRIGL SERPL-MCNC: 163 MG/DL (ref 0–149)
TSH SERPL DL<=0.005 MIU/L-ACNC: 0.91 UIU/ML (ref 0.45–4.5)
VLDLC SERPL CALC-MCNC: 33 MG/DL (ref 5–40)

## 2017-07-31 NOTE — PROGRESS NOTES
Triglycerides slightly elevated. Recommend that patient watch diet for fatty foods and sweets and exercise as tolerated. Other labs stable.

## 2017-09-25 ENCOUNTER — HOSPITAL ENCOUNTER (OUTPATIENT)
Dept: LAB | Age: 62
Discharge: HOME | End: 2017-09-25

## 2017-12-22 RX ORDER — LEVOTHYROXINE SODIUM 150 UG/1
TABLET ORAL
Qty: 90 TAB | Refills: 3 | Status: SHIPPED | OUTPATIENT
Start: 2017-12-22 | End: 2018-12-17 | Stop reason: SDUPTHER

## 2017-12-22 RX ORDER — SIMVASTATIN 40 MG/1
TABLET, FILM COATED ORAL
Qty: 90 TAB | Refills: 3 | Status: SHIPPED | OUTPATIENT
Start: 2017-12-22 | End: 2018-12-17 | Stop reason: SDUPTHER

## 2017-12-22 RX ORDER — FELODIPINE 5 MG/1
TABLET, EXTENDED RELEASE ORAL
Qty: 90 TAB | Refills: 3 | Status: SHIPPED | OUTPATIENT
Start: 2017-12-22 | End: 2018-12-17 | Stop reason: SDUPTHER

## 2018-01-09 ENCOUNTER — OFFICE VISIT (OUTPATIENT)
Dept: INTERNAL MEDICINE CLINIC | Age: 63
End: 2018-01-09

## 2018-01-09 VITALS
HEIGHT: 71 IN | RESPIRATION RATE: 18 BRPM | OXYGEN SATURATION: 92 % | HEART RATE: 77 BPM | BODY MASS INDEX: 32.73 KG/M2 | SYSTOLIC BLOOD PRESSURE: 122 MMHG | DIASTOLIC BLOOD PRESSURE: 85 MMHG | WEIGHT: 233.8 LBS

## 2018-01-09 DIAGNOSIS — E03.9 ACQUIRED HYPOTHYROIDISM: ICD-10-CM

## 2018-01-09 DIAGNOSIS — E78.00 HYPERCHOLESTEROLEMIA: ICD-10-CM

## 2018-01-09 DIAGNOSIS — C44.91 BASAL CELL CARCINOMA, UNSPECIFIED SITE: ICD-10-CM

## 2018-01-09 DIAGNOSIS — F17.200 SMOKER: ICD-10-CM

## 2018-01-09 DIAGNOSIS — E66.9 OBESITY (BMI 30.0-34.9): ICD-10-CM

## 2018-01-09 DIAGNOSIS — I10 ESSENTIAL HYPERTENSION: Primary | ICD-10-CM

## 2018-01-09 DIAGNOSIS — G47.00 INSOMNIA, UNSPECIFIED TYPE: ICD-10-CM

## 2018-01-09 RX ORDER — TRAZODONE HYDROCHLORIDE 50 MG/1
50 TABLET ORAL
Qty: 30 TAB | Refills: 5 | Status: SHIPPED | OUTPATIENT
Start: 2018-01-09 | End: 2019-01-11 | Stop reason: SDUPTHER

## 2018-01-09 NOTE — LETTER
1/15/2018 11:58 AM 
 
Mr. Feroz Latham [address] [facility] 7 [address] Dear Feroz Latham: 
 
Please find your most recent results below. Resulted Orders LIPID PANEL Result Value Ref Range Cholesterol, total 159 100 - 199 mg/dL Triglyceride 126 0 - 149 mg/dL HDL Cholesterol 38 (L) >39 mg/dL VLDL, calculated 25 5 - 40 mg/dL LDL, calculated 96 0 - 99 mg/dL Narrative Performed at:  [address]  [id number] : Charles Alcaraz MD, Phone:  [phone] METABOLIC PANEL, BASIC Result Value Ref Range Glucose 97 65 - 99 mg/dL BUN 13 8 - 27 mg/dL Creatinine 0.88 0.76 - 1.27 mg/dL GFR est non-AA 91 >59 mL/min/1.73 GFR est  >59 mL/min/1.73  
 BUN/Creatinine ratio 15 10 - 24 Sodium 142 134 - 144 mmol/L Potassium 4.6 3.5 - 5.2 mmol/L Chloride 103 96 - 106 mmol/L  
 CO2 24 18 - 29 mmol/L Calcium 9.3 8.6 - 10.2 mg/dL Narrative Performed at:  [address]  [id number] : Charles Alcaraz MD, Phone:  [phone] ALT Result Value Ref Range ALT (SGPT) 16 0 - 44 IU/L Narrative Performed at:  [address]  [id number] : Charles Alcaraz MD, Phone:  [phone] AST Result Value Ref Range AST (SGOT) 19 0 - 40 IU/L Narrative Performed at:  [address]  [id number] : Charles Alcaraz MD, Phone:  [phone] TSH 3RD GENERATION Result Value Ref Range TSH 0.693 0.450 - 4.500 uIU/mL Narrative Performed at:  [address]  [id number] : Charles Alcaraz MD, Phone:  [phone] CVD REPORT Result Value Ref Range INTERPRETATION Note Comment:  
   Supplemental report is available. Narrative Performed at:  [address] [place] [address] [address] [address] [place], [place]  [id number] : Kori Joyner PhD, Phone:  [phone] RECOMMENDATIONS: 
None. Keep up the good work! Please call me if you have any questions: [phone] Sincerely, 
 
 
Roberto Patel, DO

## 2018-01-09 NOTE — MR AVS SNAPSHOT
Visit Information Date & Time Provider Department Dept. Phone Encounter #  
 1/9/2018  8:30 AM Maya Dumont, [address] [facility] Internal Medicine [phone] [id number] Follow-up Instructions Return in about 6 months (around 7/9/2018). Upcoming Health Maintenance Date Due Hepatitis C Screening 1955 DTaP/Tdap/Td series (1 - Tdap) 5/26/1974 FOBT Q 1 YEAR AGE 50-75 5/26/2003 ZOSTER VACCINE AGE 60> 3/26/2013 Allergies as of 1/9/2018  Review Complete On: 1/9/2018 By: Maya Dumont, DO No Known Allergies Current Immunizations  Reviewed on 1/9/2017 Name Date Influenza Vaccine 10/15/2016 Pneumococcal Polysaccharide (PPSV-23) 1/9/2017 Not reviewed this visit You Were Diagnosed With   
  
 Codes Comments Essential hypertension    -  Primary ICD-10-CM: I10 
ICD-9-CM: 401.9 Hypercholesterolemia     ICD-10-CM: E78.00 ICD-9-CM: 272.0 Acquired hypothyroidism     ICD-10-CM: E03.9 ICD-9-CM: 244.9 Obesity (BMI 30.0-34.9)     ICD-10-CM: C42.0 ICD-9-CM: 278.00 Basal cell carcinoma, unspecified site     ICD-10-CM: C44.91 
ICD-9-CM: 173.91 Smoker     ICD-10-CM: N11.538 ICD-9-CM: 305.1 Insomnia, unspecified type     ICD-10-CM: G47.00 ICD-9-CM: 780.52 Vitals BP Pulse Resp Height(growth percentile) Weight(growth percentile) SpO2  
 122/85 (BP 1 Location: Right arm, BP Patient Position: Sitting) 77 18 5' 11\" (1.803 m) 233 lb 12.8 oz (106.1 kg) 92% BMI Smoking Status 32.61 kg/m2 Current Every Day Smoker Vitals History BMI and BSA Data Body Mass Index Body Surface Area  
 32.61 kg/m 2 2.31 m 2 Preferred Pharmacy Pharmacy Name Phone [address] Gallardo, [facility] [phone] Your Updated Medication List  
  
   
This list is accurate as of: 1/9/18  9:27 AM.  Always use your most recent med list.  
  
  
  
  
 felodipine 5 mg 24 hr tablet Commonly known as:  PLENDIL SR  
TAKE 1 TABLET DAILY  
  
 levothyroxine 150 mcg tablet Commonly known as:  SYNTHROID  
TAKE 1 TABLET DAILY BEFORE BREAKFAST  
  
 simvastatin 40 mg tablet Commonly known as:  ZOCOR  
TAKE 1 TABLET DAILY  
  
 traZODone 50 mg tablet Commonly known as:  Sahra Sidney Take 1 Tab by mouth nightly. Prescriptions Sent to Pharmacy Refills  
 traZODone (DESYREL) 50 mg tablet 5 Sig: Take 1 Tab by mouth nightly. Class: Normal  
 Pharmacy: [address], [address] Ph #: [phone] Route: Oral  
  
We Performed the Following ALT [id number] CPT(R)] AST R098109 CPT(R)] LIPID PANEL [15633 CPT(R)] METABOLIC PANEL, BASIC [59815 CPT(R)] TSH 3RD GENERATION [74151 CPT(R)] Follow-up Instructions Return in about 6 months (around 7/9/2018). Introducing [facility]! Dear Veronika Macedo: 
Thank you for requesting a [employer] account. Our records indicate that you already have an active [employer] account. You can access your account anytime at [email or web address] Did you know that you can access your hospital and ER discharge instructions at any time in [employer]? You can also review all of your test results from your hospital stay or ER visit. Additional Information If you have questions, please visit the Frequently Asked Questions section of the [employer] website at [email or web address] Remember, [employer] is NOT to be used for urgent needs. For medical emergencies, dial 911. Now available from your iPhone and Android! Please provide this summary of care documentation to your next provider. Your primary care clinician is listed as Nicolas Beatty. If you have any questions after today's visit, please call [phone].

## 2018-01-09 NOTE — PROGRESS NOTES
HISTORY OF PRESENT ILLNESS  Jayne Cushing is a 57 y.o. male. Pt. comes in for f/u. Has multiple medical problems. His BP is stable. Followed by dermatologist for skin cancers. Continues to have issues with insomnia. Reports compliance with medications and diet. Med list and most recent labs/studies reviewed with pt. Trying to be active physically but not losing weight. Smokes daily. Denies alcohol use. Needs med refills. Due for repeat labs. Reports no other new c/o. Hypertension    Pertinent negatives include no chest pain, no blurred vision, no headaches, no dizziness and no shortness of breath. Cholesterol Problem   Pertinent negatives include no chest pain, no abdominal pain, no headaches and no shortness of breath. Review of Systems   Constitutional: Negative. Eyes: Negative for blurred vision. Respiratory: Negative for cough and shortness of breath. Cardiovascular: Negative for chest pain and leg swelling. Gastrointestinal: Negative for abdominal pain and heartburn. Genitourinary: Negative for dysuria. Musculoskeletal: Negative for back pain and joint pain. Skin: Negative. Neurological: Negative for dizziness, sensory change, focal weakness and headaches. Psychiatric/Behavioral: Negative for depression. The patient is not nervous/anxious and does not have insomnia. All other systems reviewed and are negative. Physical Exam   Constitutional: He is oriented to person, place, and time. He appears well-developed and well-nourished. No distress. Pleasant  Obese     HENT:   Head: Normocephalic and atraumatic. Mouth/Throat: Oropharynx is clear and moist.   Eyes: Conjunctivae are normal.   Neck: Normal range of motion. Neck supple. No JVD present. No thyromegaly present. Cardiovascular: Normal rate, regular rhythm, normal heart sounds and intact distal pulses. No murmur heard. Pulmonary/Chest: Effort normal and breath sounds normal. No respiratory distress.  He has no wheezes. He has no rales. Abdominal: Soft. Bowel sounds are normal. He exhibits no distension. obese   Musculoskeletal: He exhibits no edema or tenderness. DJD   Neurological: He is alert and oriented to person, place, and time. Coordination normal.   Skin: Skin is warm and dry. No rash noted. Psychiatric: He has a normal mood and affect. His behavior is normal.   Nursing note and vitals reviewed. ASSESSMENT and PLAN  Diagnoses and all orders for this visit:    1. Essential hypertension  -     METABOLIC PANEL, BASIC    2. Hypercholesterolemia  -     LIPID PANEL  -     ALT  -     AST    3. Acquired hypothyroidism  -     TSH 3RD GENERATION    4. Obesity (BMI 30.0-34.9)    5. Basal cell carcinoma, unspecified site    6. Smoker    7. Insomnia, unspecified type    Other orders  -     traZODone (DESYREL) 50 mg tablet; Take 1 Tab by mouth nightly. Follow-up Disposition:  Return in about 6 months (around 7/9/2018).    lab results and schedule of future lab studies reviewed with patient  reviewed diet, exercise and weight control  reviewed medications and side effects in detail  F/u with other MD's as scheduled  Overall stable

## 2018-01-09 NOTE — PROGRESS NOTES
Health Maintenance Due   Topic Date Due    Hepatitis C Screening  1955    DTaP/Tdap/Td series (1 - Tdap) 05/26/1974    FOBT Q 1 YEAR AGE 50-75  05/26/2003    ZOSTER VACCINE AGE 60>  03/26/2013    Influenza Age 9 to Adult  08/01/2017       Chief Complaint   Patient presents with    Hypertension    Cholesterol Problem    Hypothyroidism       1. Have you been to the ER, urgent care clinic since your last visit? Hospitalized since your last visit? No    2. Have you seen or consulted any other health care providers outside of the [address] [place] [address] since your last visit? Include any pap smears or colon screening. No    3) Do you have an Advance Directive on file? no    4) Are you interested in receiving information on Advance Directives? NO      Patient is accompanied by self I have received verbal consent from James Torres to discuss any/all medical information while they are present in the room.

## 2018-01-10 LAB
ALT SERPL-CCNC: 16 IU/L (ref 0–44)
AST SERPL-CCNC: 19 IU/L (ref 0–40)
BUN SERPL-MCNC: 13 MG/DL (ref 8–27)
BUN/CREAT SERPL: 15 (ref 10–24)
CALCIUM SERPL-MCNC: 9.3 MG/DL (ref 8.6–10.2)
CHLORIDE SERPL-SCNC: 103 MMOL/L (ref 96–106)
CHOLEST SERPL-MCNC: 159 MG/DL (ref 100–199)
CO2 SERPL-SCNC: 24 MMOL/L (ref 18–29)
CREAT SERPL-MCNC: 0.88 MG/DL (ref 0.76–1.27)
GLUCOSE SERPL-MCNC: 97 MG/DL (ref 65–99)
HDLC SERPL-MCNC: 38 MG/DL
INTERPRETATION, 910389: NORMAL
LDLC SERPL CALC-MCNC: 96 MG/DL (ref 0–99)
POTASSIUM SERPL-SCNC: 4.6 MMOL/L (ref 3.5–5.2)
SODIUM SERPL-SCNC: 142 MMOL/L (ref 134–144)
TRIGL SERPL-MCNC: 126 MG/DL (ref 0–149)
TSH SERPL DL<=0.005 MIU/L-ACNC: 0.69 UIU/ML (ref 0.45–4.5)
VLDLC SERPL CALC-MCNC: 25 MG/DL (ref 5–40)

## 2018-07-10 ENCOUNTER — OFFICE VISIT (OUTPATIENT)
Dept: INTERNAL MEDICINE CLINIC | Age: 63
End: 2018-07-10

## 2018-07-10 VITALS
SYSTOLIC BLOOD PRESSURE: 122 MMHG | TEMPERATURE: 97.1 F | BODY MASS INDEX: 32.55 KG/M2 | RESPIRATION RATE: 16 BRPM | OXYGEN SATURATION: 95 % | HEIGHT: 71 IN | DIASTOLIC BLOOD PRESSURE: 86 MMHG | WEIGHT: 232.5 LBS | HEART RATE: 80 BPM

## 2018-07-10 DIAGNOSIS — E66.9 OBESITY (BMI 30.0-34.9): ICD-10-CM

## 2018-07-10 DIAGNOSIS — Z71.89 ACP (ADVANCE CARE PLANNING): ICD-10-CM

## 2018-07-10 DIAGNOSIS — I10 ESSENTIAL HYPERTENSION: Primary | ICD-10-CM

## 2018-07-10 DIAGNOSIS — E03.9 ACQUIRED HYPOTHYROIDISM: ICD-10-CM

## 2018-07-10 DIAGNOSIS — Z87.81 HISTORY OF FRACTURE OF RIGHT ANKLE: ICD-10-CM

## 2018-07-10 DIAGNOSIS — E78.00 HYPERCHOLESTEROLEMIA: ICD-10-CM

## 2018-07-10 DIAGNOSIS — M65.321 TRIGGER INDEX FINGER OF RIGHT HAND: ICD-10-CM

## 2018-07-10 DIAGNOSIS — F17.200 SMOKER: ICD-10-CM

## 2018-07-10 DIAGNOSIS — Z23 ENCOUNTER FOR IMMUNIZATION: ICD-10-CM

## 2018-07-10 NOTE — PROGRESS NOTES
Chief Complaint   Patient presents with    Cholesterol Problem    Hypertension       1. Have you been to the ER, urgent care clinic since your last visit? Hospitalized since your last visit? no    2. Have you seen or consulted any other health care providers outside of the [address] [place] [address] since your last visit? Include any pap smears or colon screening.   No    Visit Vitals    /86 (BP 1 Location: Right arm, BP Patient Position: Sitting)    Pulse 80    Temp 97.1 °F (36.2 °C) (Oral)    Resp 16    Ht 5' 11\" (1.803 m)    Wt 232 lb 8 oz (105.5 kg)    SpO2 95%    BMI 32.43 kg/m2

## 2018-07-10 NOTE — MR AVS SNAPSHOT
[facility] [address] [facility] [address] 
[phone] Patient: Danielle Santoyo MRN: [id number] LTF:[birth year] Visit Information Date & Time Provider Department Dept. Phone Encounter #  
 7/10/2018  8:30 AM Richard Cagle, [facility] Medicine [phone] [id number] Follow-up Instructions Return in about 6 months (around 1/10/2019). Upcoming Health Maintenance Date Due Hepatitis C Screening 1955 DTaP/Tdap/Td series (1 - Tdap) 5/26/1974 FOBT Q 1 YEAR AGE 50-75 5/26/2003 ZOSTER VACCINE AGE 60> 3/26/2013 GLAUCOMA SCREENING Q2Y 5/26/2018 Pneumococcal 65+ Low/Medium Risk (1 of 2 - PCV13) 5/26/2018 Influenza Age 5 to Adult 8/1/2018 Allergies as of 7/10/2018  Review Complete On: 7/10/2018 By: Richard Cagle DO No Known Allergies Current Immunizations  Reviewed on 7/10/2018 Name Date Influenza Vaccine 10/15/2016 Pneumococcal Polysaccharide (PPSV-23) 1/9/2017 Reviewed by Richard Cagle DO on 7/10/2018 at  8:46 AM  
You Were Diagnosed With   
  
 Codes Comments Essential hypertension    -  Primary ICD-10-CM: I10 
ICD-9-CM: 401.9 Hypercholesterolemia     ICD-10-CM: E78.00 ICD-9-CM: 272.0 Acquired hypothyroidism     ICD-10-CM: E03.9 ICD-9-CM: 244.9 Obesity (BMI 30.0-34.9)     ICD-10-CM: J50.3 ICD-9-CM: 278.00 History of fracture of right ankle     ICD-10-CM: Z87.81 ICD-9-CM: V15.51 Encounter for immunization     ICD-10-CM: X08 ICD-9-CM: V03.89 Smoker     ICD-10-CM: Q66.503 ICD-9-CM: 305.1 Trigger index finger of right hand     ICD-10-CM: M65.321 ICD-9-CM: 727.03   
 ACP (advance care planning)     ICD-10-CM: Z71.89 ICD-9-CM: V65.49 Vitals BP Pulse Temp Resp Height(growth percentile) Weight(growth percentile)  122/86 (BP 1 Location: Right arm, BP Patient Position: Sitting) 80 97.1 °F (36.2 °C) (Oral) 16 5' 11\" (1.803 m) 232 lb 8 oz (105.5 kg) SpO2 BMI Smoking Status 95% 32.43 kg/m2 Current Every Day Smoker BMI and BSA Data Body Mass Index Body Surface Area  
 32.43 kg/m 2 2.3 m 2 Preferred Pharmacy Pharmacy Name Phone Kirk Tran, [facility] [phone] Your Updated Medication List  
  
   
This list is accurate as of 7/10/18  8:54 AM.  Always use your most recent med list.  
  
  
  
  
 felodipine 5 mg 24 hr tablet Commonly known as:  PLENDIL SR  
TAKE 1 TABLET DAILY  
  
 levothyroxine 150 mcg tablet Commonly known as:  SYNTHROID  
TAKE 1 TABLET DAILY BEFORE BREAKFAST pneumococcal 13 deirrde conj dip 0.5 mL Syrg injection Commonly known as:  PREVNAR-13  
0.5 mL by IntraMUSCular route once for 1 dose. simvastatin 40 mg tablet Commonly known as:  ZOCOR  
TAKE 1 TABLET DAILY  
  
 traZODone 50 mg tablet Commonly known as:  Hessie Hurtado Take 1 Tab by mouth nightly. Prescriptions Sent to Pharmacy Refills  
 pneumococcal 13 deirdre conj dip (PREVNAR-13) 0.5 mL syrg injection 0 Si.5 mL by IntraMUSCular route once for 1 dose. Class: Normal  
 Pharmacy: [address] [place] [address], [address] [place] [address] #: [phone] Route: IntraMUSCular We Performed the Following LIPID PANEL [36452 CPT(R)] METABOLIC PANEL, COMPREHENSIVE [92579 CPT(R)] TSH 3RD GENERATION [96433 CPT(R)] Follow-up Instructions Return in about 6 months (around 1/10/2019). Introducing [facility]! Dear Jackelyn Lui: 
Thank you for requesting a [employer] account. Our records indicate that you already have an active [employer] account. You can access your account anytime at [email or web address] Did you know that you can access your hospital and ER discharge instructions at any time in [employer]? You can also review all of your test results from your hospital stay or ER visit. Additional Information If you have questions, please visit the Frequently Asked Questions section of the [employer] website at [email or web address] [employer] [email or web address] Groupt/. Remember, [employer]let is NOT to be used for urgent needs. For medical emergencies, dial 911. Now available from your iPhone and Android! Please provide this summary of care documentation to your next provider. Your primary care clinician is listed as Naima Cr. If you have any questions after today's visit, please call [phone].

## 2018-07-10 NOTE — PROGRESS NOTES
HISTORY OF PRESENT ILLNESS  Paulette Carbone is a 70 y.o. male. Pt. comes in for f/u. Has multiple medical problems. BP is stable. Has a right index trigger finger. Has chronic right ankle pain. Has seen orthopedic MD but not interested in surgery. He is obese. Reports compliance with medications and diet. Med list and most recent labs/studies reviewed with pt. Trying to be active physically to control weight. Due for repeat labs. Needs Prevnar injection. ACP discussed with patient. Reports no other new c/o. Cholesterol Problem   Pertinent negatives include no chest pain, no abdominal pain, no headaches and no shortness of breath. Hypertension    Pertinent negatives include no chest pain, no blurred vision, no headaches, no dizziness and no shortness of breath. Finger Pain   Pertinent negatives include no chest pain, no abdominal pain, no headaches and no shortness of breath. Follow Up Chronic Condition   Pertinent negatives include no chest pain, no abdominal pain, no headaches and no shortness of breath. Review of Systems   Constitutional: Negative. HENT: Negative. Eyes: Negative for blurred vision. Respiratory: Negative for shortness of breath. Cardiovascular: Negative for chest pain and leg swelling. Gastrointestinal: Negative for abdominal pain and heartburn. Genitourinary: Negative for dysuria. Musculoskeletal: Positive for joint pain. Negative for back pain and falls. Skin: Negative. Neurological: Negative for dizziness, sensory change, focal weakness and headaches. Psychiatric/Behavioral: Negative for depression. The patient has insomnia. The patient is not nervous/anxious. All other systems reviewed and are negative. Physical Exam   Constitutional: He is oriented to person, place, and time. He appears well-developed and well-nourished. No distress. Pleasant  Obese     HENT:   Head: Normocephalic and atraumatic.    Mouth/Throat: Oropharynx is clear and moist.   Eyes: Conjunctivae are normal.   Neck: Normal range of motion. Neck supple. No JVD present. No thyromegaly present. Cardiovascular: Normal rate, regular rhythm, normal heart sounds and intact distal pulses. No murmur heard. Pulmonary/Chest: Effort normal and breath sounds normal. No respiratory distress. He has no wheezes. He has no rales. Abdominal: Soft. Bowel sounds are normal. He exhibits no distension. obese   Musculoskeletal: He exhibits tenderness (Right ankle, chronic//right index finger base). He exhibits no edema. DJD   Neurological: He is alert and oriented to person, place, and time. Coordination normal.   Skin: Skin is warm and dry. No rash noted. Psychiatric: He has a normal mood and affect. His behavior is normal.   Nursing note and vitals reviewed. ASSESSMENT and PLAN  Diagnoses and all orders for this visit:    1. Essential hypertension  -     METABOLIC PANEL, COMPREHENSIVE    2. Hypercholesterolemia  -     LIPID PANEL  -     METABOLIC PANEL, COMPREHENSIVE    3. Acquired hypothyroidism  -     TSH 3RD GENERATION    4. Obesity (BMI 30.0-34.9)    5. History of fracture of right ankle    6. Encounter for immunization    7. Smoker    8. Trigger index finger of right hand    9. ACP (advance care planning)    Other orders  -     pneumococcal 13 deirdre conj dip (PREVNAR-13) 0.5 mL syrg injection; 0.5 mL by IntraMUSCular route once for 1 dose. Follow-up Disposition:  Return in about 6 months (around 1/10/2019).    lab results and schedule of future lab studies reviewed with patient  reviewed diet, exercise and weight control  reviewed medications and side effects in detail  F/u with other MD's as scheduled  Overall stable  Advised patient to quit smoking

## 2018-07-10 NOTE — PROGRESS NOTES
Immunization History   Administered Date(s) Administered    Influenza Vaccine 10/15/2016    Pneumococcal Conjugate (PCV-13) 07/10/2018    Pneumococcal Polysaccharide (PPSV-23) 01/09/2017     Vaccine given. no side effects noted. pat. Observed for 15 min.

## 2018-07-11 LAB
ALBUMIN SERPL-MCNC: 4.5 G/DL (ref 3.6–4.8)
ALBUMIN/GLOB SERPL: 1.9 {RATIO} (ref 1.2–2.2)
ALP SERPL-CCNC: 56 IU/L (ref 39–117)
ALT SERPL-CCNC: 18 IU/L (ref 0–44)
AST SERPL-CCNC: 24 IU/L (ref 0–40)
BILIRUB SERPL-MCNC: 0.4 MG/DL (ref 0–1.2)
BUN SERPL-MCNC: 12 MG/DL (ref 8–27)
BUN/CREAT SERPL: 13 (ref 10–24)
CALCIUM SERPL-MCNC: 9.2 MG/DL (ref 8.6–10.2)
CHLORIDE SERPL-SCNC: 105 MMOL/L (ref 96–106)
CHOLEST SERPL-MCNC: 149 MG/DL (ref 100–199)
CO2 SERPL-SCNC: 22 MMOL/L (ref 20–29)
CREAT SERPL-MCNC: 0.92 MG/DL (ref 0.76–1.27)
GLOBULIN SER CALC-MCNC: 2.4 G/DL (ref 1.5–4.5)
GLUCOSE SERPL-MCNC: 105 MG/DL (ref 65–99)
HDLC SERPL-MCNC: 42 MG/DL
INTERPRETATION, 910389: NORMAL
LDLC SERPL CALC-MCNC: 84 MG/DL (ref 0–99)
POTASSIUM SERPL-SCNC: 4.6 MMOL/L (ref 3.5–5.2)
PROT SERPL-MCNC: 6.9 G/DL (ref 6–8.5)
SODIUM SERPL-SCNC: 143 MMOL/L (ref 134–144)
TRIGL SERPL-MCNC: 114 MG/DL (ref 0–149)
TSH SERPL DL<=0.005 MIU/L-ACNC: 0.59 UIU/ML (ref 0.45–4.5)
VLDLC SERPL CALC-MCNC: 23 MG/DL (ref 5–40)

## 2018-12-17 RX ORDER — LEVOTHYROXINE SODIUM 150 UG/1
TABLET ORAL
Qty: 90 TAB | Refills: 3 | Status: SHIPPED | OUTPATIENT
Start: 2018-12-17 | End: 2019-09-25 | Stop reason: SDUPTHER

## 2018-12-17 RX ORDER — SIMVASTATIN 40 MG/1
TABLET, FILM COATED ORAL
Qty: 90 TAB | Refills: 3 | Status: SHIPPED | OUTPATIENT
Start: 2018-12-17 | End: 2019-09-25 | Stop reason: SDUPTHER

## 2018-12-17 RX ORDER — FELODIPINE 5 MG/1
TABLET, EXTENDED RELEASE ORAL
Qty: 90 TAB | Refills: 3 | Status: SHIPPED | OUTPATIENT
Start: 2018-12-17 | End: 2019-09-25 | Stop reason: SDUPTHER

## 2019-01-11 ENCOUNTER — OFFICE VISIT (OUTPATIENT)
Dept: INTERNAL MEDICINE CLINIC | Age: 64
End: 2019-01-11

## 2019-01-11 VITALS
OXYGEN SATURATION: 99 % | HEART RATE: 78 BPM | WEIGHT: 223.2 LBS | TEMPERATURE: 95.8 F | SYSTOLIC BLOOD PRESSURE: 132 MMHG | RESPIRATION RATE: 17 BRPM | HEIGHT: 71 IN | DIASTOLIC BLOOD PRESSURE: 90 MMHG | BODY MASS INDEX: 31.25 KG/M2

## 2019-01-11 DIAGNOSIS — E03.9 ACQUIRED HYPOTHYROIDISM: ICD-10-CM

## 2019-01-11 DIAGNOSIS — E66.9 OBESITY (BMI 30.0-34.9): ICD-10-CM

## 2019-01-11 DIAGNOSIS — E78.00 HYPERCHOLESTEROLEMIA: ICD-10-CM

## 2019-01-11 DIAGNOSIS — I10 ESSENTIAL HYPERTENSION: Primary | ICD-10-CM

## 2019-01-11 DIAGNOSIS — Z87.81 HISTORY OF FRACTURE OF RIGHT ANKLE: ICD-10-CM

## 2019-01-11 RX ORDER — TRAZODONE HYDROCHLORIDE 100 MG/1
100 TABLET ORAL
Qty: 90 TAB | Refills: 1 | Status: SHIPPED | OUTPATIENT
Start: 2019-01-11 | End: 2019-07-18 | Stop reason: SDUPTHER

## 2019-01-11 NOTE — PROGRESS NOTES
HISTORY OF PRESENT ILLNESS  Mati Montero is a 70 y.o. male. Pt. comes in for f/u. Has multiple medical problems. Reports continued issues with insomnia. Trazodone 50 mg helps some. Has been using Advil PM as needed as well. Continues to have chronic right ankle pain due to previous injury. Has seen orthopedic MD but not interested in any surgical intervention. Continues to smoke a pack daily. Denies dyspnea or chest pain. Drinks alcohol socially. Reports compliance with medications and diet. Med list and most recent labs/studies reviewed with pt. Trying to be active physically as tolerated. Needs med refills. Due for repeat labs. Reports no other new c/o. HPI    Review of Systems   Constitutional: Negative. HENT: Negative. Eyes: Negative for blurred vision. Respiratory: Negative for shortness of breath. Cardiovascular: Negative for chest pain and leg swelling. Gastrointestinal: Negative for abdominal pain and heartburn. Genitourinary: Negative for dysuria. Musculoskeletal: Positive for joint pain. Negative for back pain and falls. Skin: Negative. Neurological: Negative for dizziness, sensory change, focal weakness and headaches. Psychiatric/Behavioral: Negative for depression. The patient has insomnia. The patient is not nervous/anxious. All other systems reviewed and are negative. Physical Exam   Constitutional: He is oriented to person, place, and time. He appears well-developed and well-nourished. No distress. Pleasant  Obese     HENT:   Head: Normocephalic and atraumatic. Mouth/Throat: Oropharynx is clear and moist.   Eyes: Conjunctivae are normal.   Neck: Normal range of motion. Neck supple. No JVD present. No thyromegaly present. Cardiovascular: Normal rate, regular rhythm, normal heart sounds and intact distal pulses. No murmur heard. Pulmonary/Chest: Effort normal and breath sounds normal. No respiratory distress. He has no wheezes. He has no rales. Abdominal: Soft. Bowel sounds are normal. He exhibits no distension. obese   Musculoskeletal: He exhibits tenderness (Right ankle, chronic). He exhibits no edema. DJD   Neurological: He is alert and oriented to person, place, and time. Coordination normal.   Skin: Skin is warm and dry. No rash noted. Psychiatric: He has a normal mood and affect. His behavior is normal.   Nursing note and vitals reviewed. ASSESSMENT and PLAN  Diagnoses and all orders for this visit:    1. Essential hypertension  -     METABOLIC PANEL, COMPREHENSIVE; Future    2. Hypercholesterolemia  -     LIPID PANEL; Future  -     METABOLIC PANEL, COMPREHENSIVE; Future    3. Acquired hypothyroidism  -     TSH 3RD GENERATION; Future    4. Obesity (BMI 30.0-34.9)    5. History of fracture of right ankle    Other orders  -     Increase traZODone (DESYREL) 100 mg tablet; Take 1 Tab by mouth nightly. Follow-up Disposition:  Return in about 6 months (around 7/11/2019).    lab results and schedule of future lab studies reviewed with patient  reviewed diet, exercise and weight control  reviewed medications and side effects in detail  F/u with other MD's as scheduled  Advised patient to quit smoking  Advised patient to limit use of OTC sleeping pills like Advil PM because of potential side effects

## 2019-01-11 NOTE — PROGRESS NOTES
Health Maintenance Due   Topic Date Due    Hepatitis C Screening  1955    DTaP/Tdap/Td series (1 - Tdap) 05/26/1974    Shingrix Vaccine Age 50> (1 of 2) 05/26/2003    FOBT Q 1 YEAR AGE 50-75  05/26/2003    GLAUCOMA SCREENING Q2Y  05/26/2018    AAA Screening 73-69 YO Male Smoking Patients  05/26/2018    Influenza Age 5 to Adult  08/01/2018       No chief complaint on file. 1. Have you been to the ER, urgent care clinic since your last visit? Hospitalized since your last visit? No    2. Have you seen or consulted any other health care providers outside of the [address] [place] [address] since your last visit? Include any pap smears or colon screening. No    3) Do you have an Advance Directive on file? no    4) Are you interested in receiving information on Advance Directives? NO      Patient is accompanied by self I have received verbal consent from Suleiman Ballesteros to discuss any/all medical information while they are present in the room.

## 2019-07-06 LAB
ALBUMIN SERPL-MCNC: 4.6 G/DL (ref 3.6–4.8)
ALBUMIN/GLOB SERPL: 1.9 {RATIO} (ref 1.2–2.2)
ALP SERPL-CCNC: 72 IU/L (ref 39–117)
ALT SERPL-CCNC: 17 IU/L (ref 0–44)
AST SERPL-CCNC: 19 IU/L (ref 0–40)
BILIRUB SERPL-MCNC: 0.4 MG/DL (ref 0–1.2)
BUN SERPL-MCNC: 8 MG/DL (ref 8–27)
BUN/CREAT SERPL: 8 (ref 10–24)
CALCIUM SERPL-MCNC: 9.8 MG/DL (ref 8.6–10.2)
CHLORIDE SERPL-SCNC: 105 MMOL/L (ref 96–106)
CHOLEST SERPL-MCNC: 135 MG/DL (ref 100–199)
CO2 SERPL-SCNC: 24 MMOL/L (ref 20–29)
CREAT SERPL-MCNC: 0.95 MG/DL (ref 0.76–1.27)
GLOBULIN SER CALC-MCNC: 2.4 G/DL (ref 1.5–4.5)
GLUCOSE SERPL-MCNC: 118 MG/DL (ref 65–99)
HDLC SERPL-MCNC: 46 MG/DL
INTERPRETATION, 910389: NORMAL
LDLC SERPL CALC-MCNC: 71 MG/DL (ref 0–99)
POTASSIUM SERPL-SCNC: 4.4 MMOL/L (ref 3.5–5.2)
PROT SERPL-MCNC: 7 G/DL (ref 6–8.5)
SODIUM SERPL-SCNC: 143 MMOL/L (ref 134–144)
TRIGL SERPL-MCNC: 88 MG/DL (ref 0–149)
TSH SERPL DL<=0.005 MIU/L-ACNC: 0.56 UIU/ML (ref 0.45–4.5)
VLDLC SERPL CALC-MCNC: 18 MG/DL (ref 5–40)

## 2019-07-10 DIAGNOSIS — E78.00 HYPERCHOLESTEROLEMIA: ICD-10-CM

## 2019-07-10 DIAGNOSIS — I10 ESSENTIAL HYPERTENSION: ICD-10-CM

## 2019-07-11 DIAGNOSIS — E03.9 ACQUIRED HYPOTHYROIDISM: ICD-10-CM

## 2019-07-12 ENCOUNTER — OFFICE VISIT (OUTPATIENT)
Dept: INTERNAL MEDICINE CLINIC | Age: 64
End: 2019-07-12

## 2019-07-12 VITALS
TEMPERATURE: 97.6 F | OXYGEN SATURATION: 98 % | SYSTOLIC BLOOD PRESSURE: 126 MMHG | HEART RATE: 85 BPM | HEIGHT: 71 IN | RESPIRATION RATE: 16 BRPM | DIASTOLIC BLOOD PRESSURE: 84 MMHG | BODY MASS INDEX: 29.09 KG/M2 | WEIGHT: 207.8 LBS

## 2019-07-12 DIAGNOSIS — G47.00 INSOMNIA, UNSPECIFIED TYPE: ICD-10-CM

## 2019-07-12 DIAGNOSIS — E03.9 ACQUIRED HYPOTHYROIDISM: ICD-10-CM

## 2019-07-12 DIAGNOSIS — F17.200 SMOKER: ICD-10-CM

## 2019-07-12 DIAGNOSIS — C44.91 BASAL CELL CARCINOMA (BCC), UNSPECIFIED SITE: ICD-10-CM

## 2019-07-12 DIAGNOSIS — R73.9 HYPERGLYCEMIA: ICD-10-CM

## 2019-07-12 DIAGNOSIS — I10 ESSENTIAL HYPERTENSION: Primary | ICD-10-CM

## 2019-07-12 DIAGNOSIS — E78.00 HYPERCHOLESTEROLEMIA: ICD-10-CM

## 2019-07-12 LAB — HBA1C MFR BLD HPLC: 6.2 %

## 2019-07-12 NOTE — PROGRESS NOTES
Health Maintenance Due   Topic Date Due    Hepatitis C Screening  1955    DTaP/Tdap/Td series (1 - Tdap) 05/26/1974    Shingrix Vaccine Age 50> (1 of 2) 05/26/2003    FOBT Q 1 YEAR AGE 50-75  05/26/2003    GLAUCOMA SCREENING Q2Y  05/26/2018    AAA Screening 73-67 YO Male Smoking Patients  05/26/2018       Chief Complaint   Patient presents with    Hypertension    Cholesterol Problem    Thyroid Problem       1. Have you been to the ER, urgent care clinic since your last visit? Hospitalized since your last visit? No    2. Have you seen or consulted any other health care providers outside of the [address] [place] [address] since your last visit? Include any pap smears or colon screening. No    3) Do you have an Advance Directive on file? no    4) Are you interested in receiving information on Advance Directives? NO      Patient is accompanied by self I have received verbal consent from John Sullivan to discuss any/all medical information while they are present in the room.

## 2019-07-18 RX ORDER — TRAZODONE HYDROCHLORIDE 100 MG/1
100 TABLET ORAL
Qty: 90 TAB | Refills: 1 | Status: SHIPPED | OUTPATIENT
Start: 2019-07-18 | End: 2019-09-04 | Stop reason: SDUPTHER

## 2019-07-19 ENCOUNTER — TELEPHONE (OUTPATIENT)
Dept: INTERNAL MEDICINE CLINIC | Age: 64
End: 2019-07-19

## 2019-07-30 NOTE — PROGRESS NOTES
HISTORY OF PRESENT ILLNESS  Marcelo Lew is a 75 y.o. male. Pt. comes in for f/u. Has multiple medical problems. BP and cholesterol has been stable. Continues to smoke daily. Drinks alcohol socially. He is overweight. Recent glucose was high. A1c in the office is 6.2. Continues to have issues with insomnia. Also history of skin cancer. Followed by dermatologist.  Reports compliance with medications and diet. Med list and most recent labs/studies reviewed with pt. Trying to be active physically to control weight. Reports no other new c/o. HPI    Review of Systems   Constitutional: Negative. HENT: Negative. Eyes: Negative for blurred vision. Respiratory: Negative for shortness of breath. Cardiovascular: Negative for chest pain and leg swelling. Gastrointestinal: Negative for abdominal pain and heartburn. Genitourinary: Negative for dysuria. Musculoskeletal: Positive for joint pain. Negative for back pain and falls. Skin: Negative. Neurological: Negative for dizziness, sensory change, focal weakness and headaches. Psychiatric/Behavioral: Negative for depression. The patient has insomnia. The patient is not nervous/anxious. All other systems reviewed and are negative. Physical Exam   Constitutional: He is oriented to person, place, and time. He appears well-developed and well-nourished. No distress. Pleasant  Obese     HENT:   Head: Normocephalic and atraumatic. Mouth/Throat: Oropharynx is clear and moist.   Eyes: Conjunctivae are normal.   Neck: Normal range of motion. Neck supple. No JVD present. No thyromegaly present. Cardiovascular: Normal rate, regular rhythm, normal heart sounds and intact distal pulses. No murmur heard. Pulmonary/Chest: Effort normal and breath sounds normal. No respiratory distress. He has no wheezes. He has no rales. Abdominal: Soft. Bowel sounds are normal. He exhibits no distension.    obese   Musculoskeletal: He exhibits tenderness (Right ankle, chronic). He exhibits no edema. DJD   Neurological: He is alert and oriented to person, place, and time. Coordination normal.   Skin: Skin is warm and dry. No rash noted. Psychiatric: He has a normal mood and affect. His behavior is normal.   Nursing note and vitals reviewed. ASSESSMENT and PLAN  Diagnoses and all orders for this visit:    1. Essential hypertension    2. Hypercholesterolemia  -     LIPID PANEL; Future  -     METABOLIC PANEL, BASIC; Future  -     ALT; Future  -     AST; Future    3. Acquired hypothyroidism    4. Insomnia, unspecified type    5. Hyperglycemia  -     AMB POC HEMOGLOBIN A1C    6. Basal cell carcinoma (BCC), unspecified site    7. Smoker      Follow-up and Dispositions    · Return in about 6 months (around 1/12/2020).      lab results and schedule of future lab studies reviewed with patient  reviewed diet, exercise and weight control  reviewed medications and side effects in detail  Etiology, prevention, and treatment of prediabetes discussed  Advised pt strongly to stop smoking   F/u with other MD's as scheduled  Overall stable

## 2019-08-22 ENCOUNTER — TELEPHONE (OUTPATIENT)
Dept: INTERNAL MEDICINE CLINIC | Age: 64
End: 2019-08-22

## 2019-09-05 RX ORDER — TRAZODONE HYDROCHLORIDE 100 MG/1
100 TABLET ORAL
Qty: 90 TAB | Refills: 1 | Status: SHIPPED | OUTPATIENT
Start: 2019-09-05 | End: 2020-06-04 | Stop reason: SDUPTHER

## 2019-09-26 RX ORDER — SIMVASTATIN 40 MG/1
40 TABLET, FILM COATED ORAL
Qty: 90 TAB | Refills: 1 | Status: SHIPPED | OUTPATIENT
Start: 2019-09-26 | End: 2020-03-13

## 2019-09-26 RX ORDER — FELODIPINE 5 MG/1
TABLET, EXTENDED RELEASE ORAL
Qty: 90 TAB | Refills: 1 | Status: SHIPPED | OUTPATIENT
Start: 2019-09-26 | End: 2020-03-13

## 2019-09-26 RX ORDER — LEVOTHYROXINE SODIUM 150 UG/1
TABLET ORAL
Qty: 90 TAB | Refills: 1 | Status: SHIPPED | OUTPATIENT
Start: 2019-09-26 | End: 2020-03-13

## 2020-01-08 DIAGNOSIS — E78.00 HYPERCHOLESTEROLEMIA: ICD-10-CM

## 2020-01-11 LAB
ALT SERPL-CCNC: 14 IU/L (ref 0–44)
AST SERPL-CCNC: 15 IU/L (ref 0–40)
BUN SERPL-MCNC: 14 MG/DL (ref 8–27)
BUN/CREAT SERPL: 17 (ref 10–24)
CALCIUM SERPL-MCNC: 9.5 MG/DL (ref 8.6–10.2)
CHLORIDE SERPL-SCNC: 103 MMOL/L (ref 96–106)
CHOLEST SERPL-MCNC: 161 MG/DL (ref 100–199)
CO2 SERPL-SCNC: 22 MMOL/L (ref 20–29)
CREAT SERPL-MCNC: 0.84 MG/DL (ref 0.76–1.27)
GLUCOSE SERPL-MCNC: 102 MG/DL (ref 65–99)
HDLC SERPL-MCNC: 47 MG/DL
INTERPRETATION, 910389: NORMAL
LDLC SERPL CALC-MCNC: 97 MG/DL (ref 0–99)
POTASSIUM SERPL-SCNC: 4.8 MMOL/L (ref 3.5–5.2)
SODIUM SERPL-SCNC: 142 MMOL/L (ref 134–144)
TRIGL SERPL-MCNC: 86 MG/DL (ref 0–149)
VLDLC SERPL CALC-MCNC: 17 MG/DL (ref 5–40)

## 2020-01-17 ENCOUNTER — OFFICE VISIT (OUTPATIENT)
Dept: INTERNAL MEDICINE CLINIC | Age: 65
End: 2020-01-17

## 2020-01-17 VITALS
TEMPERATURE: 98 F | HEIGHT: 71 IN | OXYGEN SATURATION: 97 % | WEIGHT: 199.8 LBS | RESPIRATION RATE: 18 BRPM | BODY MASS INDEX: 27.97 KG/M2 | SYSTOLIC BLOOD PRESSURE: 120 MMHG | HEART RATE: 79 BPM | DIASTOLIC BLOOD PRESSURE: 82 MMHG

## 2020-01-17 DIAGNOSIS — F17.200 SMOKER: ICD-10-CM

## 2020-01-17 DIAGNOSIS — E03.9 ACQUIRED HYPOTHYROIDISM: ICD-10-CM

## 2020-01-17 DIAGNOSIS — E78.00 HYPERCHOLESTEROLEMIA: ICD-10-CM

## 2020-01-17 DIAGNOSIS — G47.00 INSOMNIA, UNSPECIFIED TYPE: ICD-10-CM

## 2020-01-17 DIAGNOSIS — E66.3 OVERWEIGHT: ICD-10-CM

## 2020-01-17 DIAGNOSIS — Z11.59 NEED FOR HEPATITIS C SCREENING TEST: ICD-10-CM

## 2020-01-17 DIAGNOSIS — I10 ESSENTIAL HYPERTENSION: Primary | ICD-10-CM

## 2020-01-17 NOTE — PROGRESS NOTES
Katie Todd is a 75 y.o. male    Chief Complaint   Patient presents with    Follow-up    Hypertension     1. Have you been to the ER, urgent care clinic since your last visit? Hospitalized since your last visit? No      2. Have you seen or consulted any other health care providers outside of the [address] [place] [address] since your last visit? Include any pap smears or colon screening.   No

## 2020-01-17 NOTE — LETTER
1/17/2020 4:13 PM 
 
Mr. Johnny Larsen [address] [id number] Dear Johnny Larsen: 
 
Please find your most recent results below. Resulted Orders LIPID PANEL (Collected: 1/10/2020  9:00 AM) Result Value Ref Range Cholesterol, total 161 100 - 199 mg/dL Triglyceride 86 0 - 149 mg/dL HDL Cholesterol 47 >39 mg/dL VLDL, calculated 17 5 - 40 mg/dL LDL, calculated 97 0 - 99 mg/dL Narrative Performed at:  [address]  [id number] : Liz Pace MD, Phone:  [phone] METABOLIC PANEL, BASIC (Collected: 1/10/2020  9:00 AM) Result Value Ref Range Glucose 102 (H) 65 - 99 mg/dL BUN 14 8 - 27 mg/dL Creatinine 0.84 0.76 - 1.27 mg/dL GFR est non-AA 91 >59 mL/min/1.73 GFR est  >59 mL/min/1.73  
 BUN/Creatinine ratio 17 10 - 24 Sodium 142 134 - 144 mmol/L Potassium 4.8 3.5 - 5.2 mmol/L Chloride 103 96 - 106 mmol/L  
 CO2 22 20 - 29 mmol/L Calcium 9.5 8.6 - 10.2 mg/dL Narrative Performed at:  [address]  [id number] : Liz Pace MD, Phone:  [phone] ALT (Collected: 1/10/2020  9:00 AM) Result Value Ref Range ALT (SGPT) 14 0 - 44 IU/L Narrative Performed at:  [address]  [id number] : Liz Pace MD, Phone:  [phone] AST (Collected: 1/10/2020  9:00 AM) Result Value Ref Range AST (SGOT) 15 0 - 40 IU/L Narrative Performed at:  [address]  [id number] : Liz Pace MD, Phone:  [phone] CVD REPORT (Collected: 1/10/2020  9:00 AM) Result Value Ref Range INTERPRETATION Note Comment:  
   Supplemental report is available. Narrative Performed at:  [address]  [id number] : Martha Ellis MD, Phone:  [phone] RECOMMENDATIONS: 
Borderline high sugar  --  watch sweets/carbs/starchy food Otherwise all your labs are stable Please call me if you have any questions: [phone] Sincerely, 
 
 
Balaji Pacheco, DO

## 2020-01-27 NOTE — PROGRESS NOTES
HISTORY OF PRESENT ILLNESS  Jessie Gordon is a 75 y.o. male. Pt. comes in for f/u. Has multiple medical problems. BP is stable. Has HLD and hypothyroidism as well. They have been stable. .  Continues to smoke daily. Drinks alcohol socially. He is overweight. Most recent A1c was 6.2. Continues to have issues with insomnia. Trazodone helps. Has history of skin cancer. Followed by dermatologist.  Reports compliance with medications and diet. Med list and most recent labs/studies reviewed with pt. Glucose was 102. Lipids are stable. Trying to be active physically to control weight. Reports no other new c/o. Follow-up   Pertinent negatives include no chest pain, no abdominal pain, no headaches and no shortness of breath. Hypertension    Pertinent negatives include no chest pain, no blurred vision, no headaches, no dizziness and no shortness of breath. Review of Systems   Constitutional: Negative. HENT: Negative. Eyes: Negative for blurred vision. Respiratory: Negative for shortness of breath. Cardiovascular: Negative for chest pain and leg swelling. Gastrointestinal: Negative for abdominal pain and heartburn. Genitourinary: Negative for dysuria. Musculoskeletal: Positive for joint pain. Negative for back pain and falls. Skin: Negative. Neurological: Negative for dizziness, sensory change, focal weakness and headaches. Psychiatric/Behavioral: Negative for depression. The patient has insomnia. The patient is not nervous/anxious. All other systems reviewed and are negative. Physical Exam  Vitals signs and nursing note reviewed. Constitutional:       General: He is not in acute distress. Appearance: He is well-developed. Comments: Pleasant  Obese     HENT:      Head: Normocephalic and atraumatic. Eyes:      Conjunctiva/sclera: Conjunctivae normal.   Neck:      Musculoskeletal: Normal range of motion and neck supple. Thyroid: No thyromegaly. Vascular: No JVD. Cardiovascular:      Rate and Rhythm: Normal rate and regular rhythm. Heart sounds: Normal heart sounds. No murmur. Pulmonary:      Effort: Pulmonary effort is normal. No respiratory distress. Breath sounds: Normal breath sounds. No wheezing or rales. Abdominal:      General: Bowel sounds are normal. There is no distension. Palpations: Abdomen is soft. Comments: obese   Musculoskeletal:         General: Tenderness (Right ankle, chronic) present. Comments: DJD   Skin:     General: Skin is warm and dry. Findings: No rash. Neurological:      Mental Status: He is alert and oriented to person, place, and time. Coordination: Coordination normal.   Psychiatric:         Behavior: Behavior normal.         ASSESSMENT and PLAN  Diagnoses and all orders for this visit:    1. Essential hypertension    2. Hypercholesterolemia  -     LIPID PANEL; Future  -     METABOLIC PANEL, BASIC; Future  -     ALT; Future  -     AST; Future    3. Acquired hypothyroidism  -     TSH 3RD GENERATION; Future    4. Insomnia, unspecified type    5. Smoker    6. Overweight    7. Need for hepatitis C screening test  -     HEPATITIS C AB      Follow-up and Dispositions    · Return in about 6 months (around 7/17/2020).      lab results and schedule of future lab studies reviewed with patient  reviewed diet, exercise and weight control  reviewed medications and side effects in detail  Advised pt strongly to stop smoking   F/u with other MD's as scheduled  Overall stable

## 2020-03-13 RX ORDER — LEVOTHYROXINE SODIUM 150 UG/1
TABLET ORAL
Qty: 90 TAB | Refills: 1 | Status: SHIPPED | OUTPATIENT
Start: 2020-03-13 | End: 2020-09-04 | Stop reason: SDUPTHER

## 2020-03-13 RX ORDER — FELODIPINE 5 MG/1
TABLET, EXTENDED RELEASE ORAL
Qty: 90 TAB | Refills: 1 | Status: SHIPPED | OUTPATIENT
Start: 2020-03-13 | End: 2020-09-04 | Stop reason: SDUPTHER

## 2020-03-13 RX ORDER — SIMVASTATIN 40 MG/1
TABLET, FILM COATED ORAL
Qty: 90 TAB | Refills: 1 | Status: SHIPPED | OUTPATIENT
Start: 2020-03-13 | End: 2020-09-04 | Stop reason: SDUPTHER

## 2020-06-04 DIAGNOSIS — G47.00 INSOMNIA, UNSPECIFIED TYPE: Primary | ICD-10-CM

## 2020-06-04 RX ORDER — TRAZODONE HYDROCHLORIDE 100 MG/1
100 TABLET ORAL
Qty: 90 TAB | Refills: 1 | Status: SHIPPED | OUTPATIENT
Start: 2020-06-04 | End: 2020-09-04 | Stop reason: SDUPTHER

## 2020-07-11 LAB
ALT SERPL-CCNC: 17 IU/L (ref 0–44)
AST SERPL-CCNC: 19 IU/L (ref 0–40)
BUN SERPL-MCNC: 11 MG/DL (ref 8–27)
BUN/CREAT SERPL: 14 (ref 10–24)
CALCIUM SERPL-MCNC: 9.3 MG/DL (ref 8.6–10.2)
CHLORIDE SERPL-SCNC: 105 MMOL/L (ref 96–106)
CHOLEST SERPL-MCNC: 165 MG/DL (ref 100–199)
CO2 SERPL-SCNC: 24 MMOL/L (ref 20–29)
CREAT SERPL-MCNC: 0.79 MG/DL (ref 0.76–1.27)
GLUCOSE SERPL-MCNC: 105 MG/DL (ref 65–99)
HCV AB S/CO SERPL IA: <0.1 S/CO RATIO (ref 0–0.9)
HDLC SERPL-MCNC: 46 MG/DL
INTERPRETATION, 910389: NORMAL
LDLC SERPL CALC-MCNC: 95 MG/DL (ref 0–99)
POTASSIUM SERPL-SCNC: 4.6 MMOL/L (ref 3.5–5.2)
SODIUM SERPL-SCNC: 144 MMOL/L (ref 134–144)
TRIGL SERPL-MCNC: 122 MG/DL (ref 0–149)
TSH SERPL DL<=0.005 MIU/L-ACNC: 0.42 UIU/ML (ref 0.45–4.5)
VLDLC SERPL CALC-MCNC: 24 MG/DL (ref 5–40)

## 2020-07-15 DIAGNOSIS — E78.00 HYPERCHOLESTEROLEMIA: ICD-10-CM

## 2020-07-17 DIAGNOSIS — E03.9 ACQUIRED HYPOTHYROIDISM: ICD-10-CM

## 2020-07-20 DIAGNOSIS — E03.9 ACQUIRED HYPOTHYROIDISM: Primary | ICD-10-CM

## 2020-07-21 ENCOUNTER — TELEPHONE (OUTPATIENT)
Dept: INTERNAL MEDICINE CLINIC | Age: 65
End: 2020-07-21

## 2020-07-21 NOTE — TELEPHONE ENCOUNTER
I spoke with pt , He confirms that he is taking Levothyroxine 150 mcg every morning. I have reviewed results and recommendations with him. I will mal these out to him today along with lab slip for repeat TSH in 6 weeks.

## 2020-07-21 NOTE — PROGRESS NOTES
I called and spoke with pt, He is taking Levothyroxine 150 mcg po every day.  TSH to be repeated in 6 weeks

## 2020-07-21 NOTE — TELEPHONE ENCOUNTER
----- Message from Brian Dc NP sent at 7/20/2020  2:15 PM EDT -----  TSH low. Will repeat in 6 weeks. Is she taking her synthroid?    All other labs stable

## 2020-09-02 LAB
T3 SERPL-MCNC: 115 NG/DL (ref 71–180)
TSH SERPL DL<=0.005 MIU/L-ACNC: 0.47 UIU/ML (ref 0.45–4.5)

## 2020-09-04 ENCOUNTER — VIRTUAL VISIT (OUTPATIENT)
Dept: INTERNAL MEDICINE CLINIC | Age: 65
End: 2020-09-04
Payer: COMMERCIAL

## 2020-09-04 DIAGNOSIS — E03.9 ACQUIRED HYPOTHYROIDISM: ICD-10-CM

## 2020-09-04 DIAGNOSIS — G47.00 INSOMNIA, UNSPECIFIED TYPE: ICD-10-CM

## 2020-09-04 DIAGNOSIS — R73.9 HYPERGLYCEMIA: ICD-10-CM

## 2020-09-04 DIAGNOSIS — E78.00 HYPERCHOLESTEROLEMIA: ICD-10-CM

## 2020-09-04 DIAGNOSIS — I10 ESSENTIAL HYPERTENSION: Primary | ICD-10-CM

## 2020-09-04 PROCEDURE — 99214 OFFICE O/P EST MOD 30 MIN: CPT | Performed by: INTERNAL MEDICINE

## 2020-09-04 RX ORDER — LEVOTHYROXINE SODIUM 150 UG/1
TABLET ORAL
Qty: 90 TAB | Refills: 1 | Status: SHIPPED | OUTPATIENT
Start: 2020-09-04 | End: 2021-03-01

## 2020-09-04 RX ORDER — TRAZODONE HYDROCHLORIDE 100 MG/1
100 TABLET ORAL
Qty: 90 TAB | Refills: 1 | Status: SHIPPED | OUTPATIENT
Start: 2020-09-04 | End: 2021-04-28 | Stop reason: SDUPTHER

## 2020-09-04 RX ORDER — FELODIPINE 5 MG/1
TABLET, EXTENDED RELEASE ORAL
Qty: 90 TAB | Refills: 1 | Status: SHIPPED | OUTPATIENT
Start: 2020-09-04 | End: 2021-03-01

## 2020-09-04 RX ORDER — SIMVASTATIN 40 MG/1
TABLET, FILM COATED ORAL
Qty: 90 TAB | Refills: 1 | Status: SHIPPED | OUTPATIENT
Start: 2020-09-04 | End: 2021-03-01

## 2020-09-04 NOTE — PROGRESS NOTES
Concepcion Solitario is a 77 y.o. male who was seen by synchronous (real-time) audio-video technology on 9/4/2020 for Results (discuss lab work); Medication Refill; Hypertension; Obesity; Nicotine Dependence; and Insomnia        Assessment & Plan:   Diagnoses and all orders for this visit:    1. Essential hypertension  -     METABOLIC PANEL, COMPREHENSIVE; Future  -     HEMOGLOBIN A1C WITH EAG; Future    2. Insomnia, unspecified type  -     traZODone (DESYREL) 100 mg tablet; Take 1 Tab by mouth nightly. 3. Hypercholesterolemia  -     LIPID PANEL; Future  -     METABOLIC PANEL, COMPREHENSIVE; Future  -     HEMOGLOBIN A1C WITH EAG; Future    4. Acquired hypothyroidism  -     TSH 3RD GENERATION; Future  -     T4, FREE; Future    5. Hyperglycemia  -     METABOLIC PANEL, COMPREHENSIVE; Future  -     HEMOGLOBIN A1C WITH EAG; Future    Other orders  -     felodipine (PLENDIL SR) 5 mg 24 hr tablet; TAKE 1 TABLET DAILY  -     simvastatin (ZOCOR) 40 mg tablet; TAKE 1 TABLET NIGHTLY  -     levothyroxine (Synthroid) 150 mcg tablet; TAKE 1 TABLET EVERY MORNING        I spent at least 25 minutes on this visit with this established patient. Subjective:     Pt. is seen virtually for f/u. Has a few chronic medical issues as documented. Reports BP being stable at home. Trazodone is helping his chronic insomnia. Remains on Zocor for HLD. Also has hypothyroidism. Taking precautions for Covid 19. Denies any related signs or symptoms including fever, cough, SOB or CP. PMH/PSH/Allergies/Social History/medication list and most recent studies reviewed with patient. Her labs from 7/2020 were stable. Repeated TSH looks stable. Tobacco use: No  Alcohol use: Social    Reports compliance with medications and diet. Trying to be active physically to control weight. Reports no other new c/o.     Plan:  Refill meds  Monitor BP at home with goal of 140/90 or less  F/u with other MD's as scheduled  Advised patient to lose weight by watching diet (decreasing sugars/carbs/fat, increasing fruits/vegetables), exercising at least 30 minutes daily, getting 7-8 hours of sleep daily, drinking plenty of water, and decreasing stress  COVID-19 precautions discussed with pt  Advised pt to get flu shot  F/u in 6 months or prn  Repeat labs in 6 months  Prior to Admission medications    Medication Sig Start Date End Date Taking? Authorizing Provider   felodipine (PLENDIL SR) 5 mg 24 hr tablet TAKE 1 TABLET DAILY 9/4/20  Yes Yon King DO   simvastatin (ZOCOR) 40 mg tablet TAKE 1 TABLET NIGHTLY 9/4/20  Yes Yon King DO   traZODone (DESYREL) 100 mg tablet Take 1 Tab by mouth nightly. 9/4/20  Yes Jazlyn Michaels DO   levothyroxine (Synthroid) 150 mcg tablet TAKE 1 TABLET EVERY MORNING 9/4/20  Yes Yon King DO   traZODone (DESYREL) 100 mg tablet Take 1 Tab by mouth nightly.  6/4/20 9/4/20  Jazlyn Michaels DO   levothyroxine (Synthroid) 150 mcg tablet TAKE 1 TABLET EVERY MORNING 3/13/20 9/4/20  Jazlyn Michaels DO   simvastatin (ZOCOR) 40 mg tablet TAKE 1 TABLET NIGHTLY 3/13/20 9/4/20  Marla King DO   felodipine (PLENDIL SR) 5 mg 24 hr tablet TAKE 1 TABLET DAILY 3/13/20 9/4/20  Jazlyn Michaels DO     Patient Active Problem List    Diagnosis Date Noted    Overweight 01/17/2020    Hyperglycemia 07/12/2019    Trigger index finger of right hand 07/10/2018    Basal cell carcinoma 01/09/2018    Insomnia 01/09/2018    Seborrheic keratoses 07/10/2017    Essential hypertension 01/09/2017    Hypercholesterolemia 01/09/2017    Acquired hypothyroidism 01/09/2017    Obesity (BMI 30.0-34.9) 01/09/2017    Colon polyps 01/09/2017    Smoker 01/09/2017    History of fracture of right ankle 01/09/2017     Current Outpatient Medications   Medication Sig Dispense Refill    felodipine (PLENDIL SR) 5 mg 24 hr tablet TAKE 1 TABLET DAILY 90 Tab 1    simvastatin (ZOCOR) 40 mg tablet TAKE 1 TABLET NIGHTLY 90 Tab 1    traZODone (DESYREL) 100 mg tablet Take 1 Tab by mouth nightly. 90 Tab 1    levothyroxine (Synthroid) 150 mcg tablet TAKE 1 TABLET EVERY MORNING 90 Tab 1     No Known Allergies  Past Medical History:   Diagnosis Date    Cancer ([address]     basal cell skin cancer x 2    Diverticulosis     History of colonic polyps     Tubular adenomas excised colonoscopically on 9/26/2011.  Hypercholesterolemia     Hypertension     Hypothyroidism      Past Surgical History:   Procedure Laterality Date    ABDOMEN SURGERY PROC UNLISTED  03/2016    hernia repair    COLONOSCOPY N/A 10/5/2016    COLONOSCOPY performed by Jada Srinivasan MD at [address] HX APPENDECTOMY  1980's    HX HEENT      tonsillectomy    HX HEENT  1970's    surgery for broken cheek bone and broken nose    HX ORTHOPAEDIC      ORIF right ankle with ligament reattachment    HX OTHER SURGICAL      surgery for basal cell skin cancer x 2. One was done in MD office and one in a surgical setting     Social History     Tobacco Use    Smoking status: Current Every Day Smoker     Packs/day: 1.00     Years: 42.00     Pack years: 42.00    Smokeless tobacco: Never Used   Substance Use Topics    Alcohol use: No       ROS    Objective:   No flowsheet data found.      [INSTRUCTIONS:  \"[x]\" Indicates a positive item  \"[]\" Indicates a negative item  -- DELETE ALL ITEMS NOT EXAMINED]    Constitutional: [x] Appears well-developed and well-nourished [x] No apparent distress      [] Abnormal -     Mental status: [x] Alert and awake  [x] Oriented to person/place/time [x] Able to follow commands    [] Abnormal -     Eyes:   EOM    [x]  Normal    [] Abnormal -   Sclera  [x]  Normal    [] Abnormal -          Discharge [x]  None visible   [] Abnormal -     HENT: [x] Normocephalic, atraumatic  [] Abnormal -   [x] Mouth/Throat: Mucous membranes are moist    External Ears [x] Normal  [] Abnormal -    Neck: [x] No visualized mass [] Abnormal -     Pulmonary/Chest: [x] Respiratory effort normal   [x] No visualized signs of difficulty breathing or respiratory distress        [] Abnormal -      Musculoskeletal:   [x] Normal gait with no signs of ataxia         [x] Normal range of motion of neck        [] Abnormal -     Neurological:        [x] No Facial Asymmetry (Cranial nerve 7 motor function) (limited exam due to video visit)          [x] No gaze palsy        [] Abnormal -          Skin:        [x] No significant exanthematous lesions or discoloration noted on facial skin         [] Abnormal -            Psychiatric:       [x] Normal Affect [] Abnormal -        [x] No Hallucinations    Other pertinent observable physical exam findings:-        We discussed the expected course, resolution and complications of the diagnosis(es) in detail. Medication risks, benefits, costs, interactions, and alternatives were discussed as indicated. I advised him to contact the office if his condition worsens, changes or fails to improve as anticipated. He expressed understanding with the diagnosis(es) and plan. Fabienne Eid, who was evaluated through a patient-initiated, synchronous (real-time) audio-video encounter, and/or his healthcare decision maker, is aware that it is a billable service, with coverage as determined by his insurance carrier. He provided verbal consent to proceed: Yes, and patient identification was verified. It was conducted pursuant to the emergency declaration under the [address] [place] [address] authority and the [employer] General Act. A caregiver was present when appropriate. Ability to conduct physical exam was limited. I was at home. The patient was at home.       Nancy Griggs DO

## 2020-09-04 NOTE — PROGRESS NOTES
Health Maintenance Due   Topic Date Due    DTaP/Tdap/Td series (1 - Tdap) 05/26/1974    Shingrix Vaccine Age 50> (1 of 2) 05/26/2003    FOBT Q1Y Age 54-65  05/26/2003    GLAUCOMA SCREENING Q2Y  05/26/2018    AAA Screening 73-67 YO Male Smoking Patients  05/26/2018    Flu Vaccine (1) 09/01/2020       Chief Complaint   Patient presents with    Results     discuss lab work    Medication Refill    Hypertension    Obesity    Nicotine Dependence    Insomnia       1. Have you been to the ER, urgent care clinic since your last visit? Hospitalized since your last visit? No    2. Have you seen or consulted any other health care providers outside of the [address] [place] [address] since your last visit? Include any pap smears or colon screening. No    3) Do you have an Advance Directive on file? no    4) Are you interested in receiving information on Advance Directives? NO      Patient is accompanied by self I have received verbal consent from Fernando Alston to discuss any/all medical information while they are present in the room.

## 2021-03-01 RX ORDER — SIMVASTATIN 40 MG/1
TABLET, FILM COATED ORAL
Qty: 90 TAB | Refills: 1 | Status: SHIPPED | OUTPATIENT
Start: 2021-03-01 | End: 2021-08-26

## 2021-03-01 RX ORDER — FELODIPINE 5 MG/1
TABLET, EXTENDED RELEASE ORAL
Qty: 90 TAB | Refills: 1 | Status: SHIPPED | OUTPATIENT
Start: 2021-03-01 | End: 2021-08-26

## 2021-03-01 RX ORDER — LEVOTHYROXINE SODIUM 150 UG/1
TABLET ORAL
Qty: 90 TAB | Refills: 1 | Status: SHIPPED | OUTPATIENT
Start: 2021-03-01 | End: 2021-03-05 | Stop reason: SDUPTHER

## 2021-03-03 ENCOUNTER — TELEPHONE (OUTPATIENT)
Dept: INTERNAL MEDICINE CLINIC | Age: 66
End: 2021-03-03

## 2021-03-03 DIAGNOSIS — E03.9 ACQUIRED HYPOTHYROIDISM: Primary | ICD-10-CM

## 2021-03-03 DIAGNOSIS — E78.00 HYPERCHOLESTEROLEMIA: ICD-10-CM

## 2021-03-03 DIAGNOSIS — R73.9 HYPERGLYCEMIA: ICD-10-CM

## 2021-03-03 DIAGNOSIS — I10 ESSENTIAL HYPERTENSION: ICD-10-CM

## 2021-03-03 LAB
ALBUMIN SERPL-MCNC: 4.5 G/DL (ref 3.8–4.8)
ALBUMIN/GLOB SERPL: 2 {RATIO} (ref 1.2–2.2)
ALP SERPL-CCNC: 72 IU/L (ref 39–117)
ALT SERPL-CCNC: 13 IU/L (ref 0–44)
AST SERPL-CCNC: 19 IU/L (ref 0–40)
BILIRUB SERPL-MCNC: 0.3 MG/DL (ref 0–1.2)
BUN SERPL-MCNC: 14 MG/DL (ref 8–27)
BUN/CREAT SERPL: 16 (ref 10–24)
CALCIUM SERPL-MCNC: 9.2 MG/DL (ref 8.6–10.2)
CHLORIDE SERPL-SCNC: 106 MMOL/L (ref 96–106)
CHOLEST SERPL-MCNC: 146 MG/DL (ref 100–199)
CO2 SERPL-SCNC: 25 MMOL/L (ref 20–29)
CREAT SERPL-MCNC: 0.86 MG/DL (ref 0.76–1.27)
EST. AVERAGE GLUCOSE BLD GHB EST-MCNC: 123 MG/DL
GLOBULIN SER CALC-MCNC: 2.2 G/DL (ref 1.5–4.5)
GLUCOSE SERPL-MCNC: 112 MG/DL (ref 65–99)
HBA1C MFR BLD: 5.9 % (ref 4.8–5.6)
HDLC SERPL-MCNC: 46 MG/DL
IMP & REVIEW OF LAB RESULTS: NORMAL
LDLC SERPL CALC-MCNC: 85 MG/DL (ref 0–99)
POTASSIUM SERPL-SCNC: 4.8 MMOL/L (ref 3.5–5.2)
PROT SERPL-MCNC: 6.7 G/DL (ref 6–8.5)
SODIUM SERPL-SCNC: 144 MMOL/L (ref 134–144)
T4 FREE SERPL-MCNC: 1.38 NG/DL (ref 0.82–1.77)
TRIGL SERPL-MCNC: 75 MG/DL (ref 0–149)
TSH SERPL DL<=0.005 MIU/L-ACNC: 0.4 UIU/ML (ref 0.45–4.5)
VLDLC SERPL CALC-MCNC: 15 MG/DL (ref 5–40)

## 2021-03-03 RX ORDER — LEVOTHYROXINE SODIUM 137 UG/1
137 TABLET ORAL
Qty: 30 TAB | Refills: 1 | Status: SHIPPED | OUTPATIENT
Start: 2021-03-03 | End: 2021-04-28 | Stop reason: SDUPTHER

## 2021-03-03 NOTE — PROGRESS NOTES
HgbA1c is 5.9, within pre-diabetic range. Watch diet for sugars and carbohydrates. Exercise regularly, as tolerated. TSH is low. Reduce levothyroxine to 137 mcg po daily. Repeat TSH and free T4 in 6 weeks. Otherwise, stable labs.

## 2021-03-03 NOTE — TELEPHONE ENCOUNTER
----- Message from [facility], NP sent at 3/3/2021  3:12 PM EST -----  HgbA1c is 5.9, within pre-diabetic range. Watch diet for sugars and carbohydrates. Exercise regularly, as tolerated. TSH is low. Reduce levothyroxine to 137 mcg po daily. Repeat TSH and free T4 in 6 weeks. Otherwise, stable labs.

## 2021-03-04 DIAGNOSIS — E03.9 ACQUIRED HYPOTHYROIDISM: ICD-10-CM

## 2021-03-04 DIAGNOSIS — R73.9 HYPERGLYCEMIA: ICD-10-CM

## 2021-03-04 DIAGNOSIS — E78.00 HYPERCHOLESTEROLEMIA: ICD-10-CM

## 2021-03-04 DIAGNOSIS — I10 ESSENTIAL HYPERTENSION: ICD-10-CM

## 2021-03-05 ENCOUNTER — VIRTUAL VISIT (OUTPATIENT)
Dept: INTERNAL MEDICINE CLINIC | Age: 66
End: 2021-03-05
Payer: COMMERCIAL

## 2021-03-05 DIAGNOSIS — R73.03 PREDIABETES: ICD-10-CM

## 2021-03-05 DIAGNOSIS — E03.9 ACQUIRED HYPOTHYROIDISM: ICD-10-CM

## 2021-03-05 DIAGNOSIS — E78.00 HYPERCHOLESTEROLEMIA: ICD-10-CM

## 2021-03-05 DIAGNOSIS — I10 ESSENTIAL HYPERTENSION: Primary | ICD-10-CM

## 2021-03-05 DIAGNOSIS — G47.00 INSOMNIA, UNSPECIFIED TYPE: ICD-10-CM

## 2021-03-05 PROCEDURE — 99214 OFFICE O/P EST MOD 30 MIN: CPT | Performed by: INTERNAL MEDICINE

## 2021-03-05 NOTE — PROGRESS NOTES
Kimberly Lombardo (: 1955) is a 77 y.o. male, established patient, here for evaluation of the following chief complaint(s):   Hypertension and Other (6m f/u)       ASSESSMENT/PLAN:  1. Essential hypertension  2. Acquired hypothyroidism  -     T4, FREE; Future  -     TSH 3RD GENERATION; Future  -     TSH 3RD GENERATION; Future  3. Hypercholesterolemia  -     LIPID PANEL; Future  -     METABOLIC PANEL, COMPREHENSIVE; Future  4. Insomnia, unspecified type  5. Prediabetes  -     LIPID PANEL; Future  -     METABOLIC PANEL, COMPREHENSIVE; Future  -     HEMOGLOBIN A1C WITH EAG; Future      Return in about 6 months (around 2021). SUBJECTIVE/OBJECTIVE:  Pt. is seen virtually for f/u. Has a few chronic medical issues as documented including HTN, HLD, insomnia, hypothyroidism, and prediabetes. Reports vital signs being stable at home. He is obese with BMI over 30. Taking precautions for Covid 19. Denies any related signs or symptoms including fever, cough, SOB or CP. PMH/PSH/Allergies/Social History/medication list and most recent studies reviewed with patient. Labs in 2020 were stable. Last TSH was 0.468. Due for repeat labs. Tobacco use: No  Alcohol use: Social    Reports compliance with medications and diet. Trying to be active physically to control weight. Reports no other new c/o.     Plan:  Repeat labs  Continue current medications  Watch diet, increase activity and lose weight  Follow-up with other MDs/specialists as scheduled  COVID-19 precautions discussed with pt  Advised patient to get Covid vaccination  Follow-up 6 months or as needed      Physical Exam    [INSTRUCTIONS:  \"[x]\" Indicates a positive item  \"[]\" Indicates a negative item  -- DELETE ALL ITEMS NOT EXAMINED]    Constitutional: [x] Appears well-developed and well-nourished [x] No apparent distress      [] Abnormal -     Mental status: [x] Alert and awake  [x] Oriented to person/place/time [x] Able to follow commands    [] Abnormal -     Eyes:   EOM    [x]  Normal    [] Abnormal -   Sclera  [x]  Normal    [] Abnormal -          Discharge [x]  None visible   [] Abnormal -     HENT: [x] Normocephalic, atraumatic  [] Abnormal -   [x] Mouth/Throat: Mucous membranes are moist    External Ears [x] Normal  [] Abnormal -    Neck: [x] No visualized mass [] Abnormal -     Pulmonary/Chest: [x] Respiratory effort normal   [x] No visualized signs of difficulty breathing or respiratory distress        [] Abnormal -      Musculoskeletal:   [x] Normal gait with no signs of ataxia         [x] Normal range of motion of neck        [] Abnormal -     Neurological:        [x] No Facial Asymmetry (Cranial nerve 7 motor function) (limited exam due to video visit)          [x] No gaze palsy        [] Abnormal -          Skin:        [x] No significant exanthematous lesions or discoloration noted on facial skin         [] Abnormal -            Psychiatric:       [x] Normal Affect [] Abnormal -        [x] No Hallucinations    Other pertinent observable physical exam findings:-        On this date 03/05/2021 I have spent 25 minutes reviewing previous notes, test results and face to face (virtual) with the patient discussing the diagnosis and importance of compliance with the treatment plan as well as documenting on the day of the visit. [facility], was evaluated through a synchronous (real-time) audio-video encounter. The patient (or guardian if applicable) is aware that this is a billable service. Verbal consent to proceed has been obtained within the past 12 months. The visit was conducted pursuant to the emergency declaration under the [address] [place] [address] and the [employer] General Act. Patient identification was verified, and a caregiver was present when appropriate.  The patient was located in a state where the provider was credentialed to provide care.      An electronic signature was used to authenticate this note.   -- Elizabeth Skinner, DO

## 2021-03-05 NOTE — PROGRESS NOTES
Results for orders placed or performed in visit on 84/85/30   METABOLIC PANEL, COMPREHENSIVE   Result Value Ref Range    Glucose 112 (H) 65 - 99 mg/dL    BUN 14 8 - 27 mg/dL    Creatinine 0.86 0.76 - 1.27 mg/dL    GFR est non-AA 90 >59 mL/min/1.73    GFR est  >59 mL/min/1.73    BUN/Creatinine ratio 16 10 - 24    Sodium 144 134 - 144 mmol/L    Potassium 4.8 3.5 - 5.2 mmol/L    Chloride 106 96 - 106 mmol/L    CO2 25 20 - 29 mmol/L    Calcium 9.2 8.6 - 10.2 mg/dL    Protein, total 6.7 6.0 - 8.5 g/dL    Albumin 4.5 3.8 - 4.8 g/dL    GLOBULIN, TOTAL 2.2 1.5 - 4.5 g/dL    A-G Ratio 2.0 1.2 - 2.2    Bilirubin, total 0.3 0.0 - 1.2 mg/dL    Alk. phosphatase 72 39 - 117 IU/L    AST (SGOT) 19 0 - 40 IU/L    ALT (SGPT) 13 0 - 44 IU/L   LIPID PANEL   Result Value Ref Range    Cholesterol, total 146 100 - 199 mg/dL    Triglyceride 75 0 - 149 mg/dL    HDL Cholesterol 46 >39 mg/dL    VLDL, calculated 15 5 - 40 mg/dL    LDL, calculated 85 0 - 99 mg/dL   HEMOGLOBIN A1C WITH EAG   Result Value Ref Range    Hemoglobin A1c 5.9 (H) 4.8 - 5.6 %    Estimated average glucose 123 mg/dL   T4, FREE   Result Value Ref Range    T4, Free 1.38 0.82 - 1.77 ng/dL   TSH 3RD GENERATION   Result Value Ref Range    TSH 0.395 (L) 0.450 - 4.500 uIU/mL   CVD REPORT   Result Value Ref Range    INTERPRETATION Note        Health Maintenance Due   Topic Date Due    COVID-19 Vaccine (1 of 2) Never done    DTaP/Tdap/Td series (1 - Tdap) Never done    Shingrix Vaccine Age 50> (1 of 2) Never done    GLAUCOMA SCREENING Q2Y  Never done    AAA Screening 73-69 YO Male Smoking Patients  Never done    Flu Vaccine (1) 09/01/2020       Chief Complaint   Patient presents with    Hypertension    Other     6m f/u       1. Have you been to the ER, urgent care clinic since your last visit? Hospitalized since your last visit? YES   [facility]  Reason : Sinus Infection   2.  Have you seen or consulted any other health care providers outside of the [facility] since your last visit? Include any pap smears or colon screening. No    3) Do you have an Advance Directive on file? no    4) Are you interested in receiving information on Advance Directives? NO      Patient is accompanied by self I have received verbal consent from [facility] to discuss any/all medical information while they are present in the room.

## 2021-04-27 LAB
T4 FREE SERPL-MCNC: 1.5 NG/DL (ref 0.82–1.77)
TSH SERPL DL<=0.005 MIU/L-ACNC: 1.63 UIU/ML (ref 0.45–4.5)

## 2021-04-28 DIAGNOSIS — E03.9 ACQUIRED HYPOTHYROIDISM: Primary | ICD-10-CM

## 2021-04-28 DIAGNOSIS — G47.00 INSOMNIA, UNSPECIFIED TYPE: ICD-10-CM

## 2021-04-30 RX ORDER — TRAZODONE HYDROCHLORIDE 100 MG/1
100 TABLET ORAL
Qty: 90 TAB | Refills: 1 | Status: SHIPPED | OUTPATIENT
Start: 2021-04-30 | End: 2022-03-30

## 2021-04-30 RX ORDER — LEVOTHYROXINE SODIUM 137 UG/1
137 TABLET ORAL
Qty: 90 TAB | Refills: 0 | Status: SHIPPED | COMMUNITY
Start: 2021-04-30 | End: 2021-06-01 | Stop reason: SDUPTHER

## 2021-06-01 DIAGNOSIS — E03.9 ACQUIRED HYPOTHYROIDISM: ICD-10-CM

## 2021-06-01 RX ORDER — LEVOTHYROXINE SODIUM 137 UG/1
137 TABLET ORAL
Qty: 90 TABLET | Refills: 0 | Status: SHIPPED | OUTPATIENT
Start: 2021-06-01 | End: 2021-10-25

## 2021-06-02 ENCOUNTER — TELEPHONE (OUTPATIENT)
Dept: INTERNAL MEDICINE CLINIC | Age: 66
End: 2021-06-02

## 2021-06-02 NOTE — TELEPHONE ENCOUNTER
Pharmacy wants to know if the patient ist [id number] grams or 137 micro grams of the levothyroxine

## 2021-08-26 RX ORDER — FELODIPINE 5 MG/1
TABLET, EXTENDED RELEASE ORAL
Qty: 90 TABLET | Refills: 1 | Status: SHIPPED | OUTPATIENT
Start: 2021-08-26 | End: 2022-02-25

## 2021-08-26 RX ORDER — SIMVASTATIN 40 MG/1
TABLET, FILM COATED ORAL
Qty: 90 TABLET | Refills: 1 | Status: SHIPPED | OUTPATIENT
Start: 2021-08-26 | End: 2022-02-25

## 2021-09-25 LAB
ALBUMIN SERPL-MCNC: 4.5 G/DL (ref 3.8–4.8)
ALBUMIN/GLOB SERPL: 1.9 {RATIO} (ref 1.2–2.2)
ALP SERPL-CCNC: 73 IU/L (ref 44–121)
ALT SERPL-CCNC: 12 IU/L (ref 0–44)
AST SERPL-CCNC: 14 IU/L (ref 0–40)
BILIRUB SERPL-MCNC: 0.4 MG/DL (ref 0–1.2)
BUN SERPL-MCNC: 12 MG/DL (ref 8–27)
BUN/CREAT SERPL: 14 (ref 10–24)
CALCIUM SERPL-MCNC: 9.3 MG/DL (ref 8.6–10.2)
CHLORIDE SERPL-SCNC: 103 MMOL/L (ref 96–106)
CHOLEST SERPL-MCNC: 160 MG/DL (ref 100–199)
CO2 SERPL-SCNC: 23 MMOL/L (ref 20–29)
CREAT SERPL-MCNC: 0.83 MG/DL (ref 0.76–1.27)
EST. AVERAGE GLUCOSE BLD GHB EST-MCNC: 126 MG/DL
GLOBULIN SER CALC-MCNC: 2.4 G/DL (ref 1.5–4.5)
GLUCOSE SERPL-MCNC: 107 MG/DL (ref 65–99)
HBA1C MFR BLD: 6 % (ref 4.8–5.6)
HDLC SERPL-MCNC: 42 MG/DL
IMP & REVIEW OF LAB RESULTS: NORMAL
LDLC SERPL CALC-MCNC: 96 MG/DL (ref 0–99)
POTASSIUM SERPL-SCNC: 4.4 MMOL/L (ref 3.5–5.2)
PROT SERPL-MCNC: 6.9 G/DL (ref 6–8.5)
SODIUM SERPL-SCNC: 140 MMOL/L (ref 134–144)
T4 FREE SERPL-MCNC: 1.52 NG/DL (ref 0.82–1.77)
TRIGL SERPL-MCNC: 122 MG/DL (ref 0–149)
TSH SERPL DL<=0.005 MIU/L-ACNC: 0.84 UIU/ML (ref 0.45–4.5)
VLDLC SERPL CALC-MCNC: 22 MG/DL (ref 5–40)

## 2021-09-27 NOTE — PROGRESS NOTES
HgbA1c more elevated, 6.0, per-diabetic range. Watch diet for foods high in sugar and carbohydrates.   Otherwise, stable labs

## 2021-09-29 ENCOUNTER — OFFICE VISIT (OUTPATIENT)
Dept: INTERNAL MEDICINE CLINIC | Age: 66
End: 2021-09-29
Payer: COMMERCIAL

## 2021-09-29 VITALS
WEIGHT: 214 LBS | TEMPERATURE: 98.1 F | DIASTOLIC BLOOD PRESSURE: 84 MMHG | BODY MASS INDEX: 29.96 KG/M2 | OXYGEN SATURATION: 96 % | RESPIRATION RATE: 16 BRPM | HEART RATE: 88 BPM | HEIGHT: 71 IN | SYSTOLIC BLOOD PRESSURE: 128 MMHG

## 2021-09-29 DIAGNOSIS — R73.03 PREDIABETES: ICD-10-CM

## 2021-09-29 DIAGNOSIS — I10 ESSENTIAL HYPERTENSION: Primary | ICD-10-CM

## 2021-09-29 DIAGNOSIS — E78.00 HYPERCHOLESTEROLEMIA: ICD-10-CM

## 2021-09-29 DIAGNOSIS — G47.00 INSOMNIA, UNSPECIFIED TYPE: ICD-10-CM

## 2021-09-29 DIAGNOSIS — E66.3 OVERWEIGHT: ICD-10-CM

## 2021-09-29 DIAGNOSIS — E03.9 ACQUIRED HYPOTHYROIDISM: ICD-10-CM

## 2021-09-29 DIAGNOSIS — F17.200 SMOKER: ICD-10-CM

## 2021-09-29 PROCEDURE — 99214 OFFICE O/P EST MOD 30 MIN: CPT | Performed by: INTERNAL MEDICINE

## 2021-09-29 NOTE — PROGRESS NOTES
HISTORY OF PRESENT ILLNESS  Emily Hall is a 74 y.o. male. Pt. comes in for f/u. Has multiple medical problems including HTN, hypothyroidism, HLD, prediabetes, insomnia. Vital signs are stable. He has gained weight. BMI is 29.85. All chronic medical issues have been stable current medications. Continues to smoke daily. Drinks alcohol socially. Reports occasional issues with insomnia. Takes trazodone as needed. Has history of skin cancer. Followed by dermatologist.  Reports compliance with medications and diet. Med list and most recent labs/studies reviewed with pt. A1c 6.0. Lipids and TSH are stable. All other labs are normal.  Trying to be active physically as tolerated. Is still working part-time. Has had Covid vaccination. Denies any related issues. Reports no other new c/o. HPI    Review of Systems   Constitutional: Negative. HENT: Negative. Eyes: Negative for blurred vision. Respiratory: Negative for shortness of breath. Cardiovascular: Negative for chest pain and leg swelling. Gastrointestinal: Negative for abdominal pain and heartburn. Genitourinary: Negative for dysuria. Musculoskeletal: Positive for joint pain. Negative for back pain and falls. Skin: Negative. Neurological: Negative for dizziness, sensory change, focal weakness and headaches. Endo/Heme/Allergies: Negative. Negative for polydipsia. Psychiatric/Behavioral: Negative for depression. The patient has insomnia. The patient is not nervous/anxious. All other systems reviewed and are negative. Physical Exam  Vitals and nursing note reviewed. Constitutional:       General: He is not in acute distress. Appearance: He is well-developed. Comments: Pleasant  Overweight     HENT:      Head: Normocephalic and atraumatic. Mouth/Throat:      Mouth: Mucous membranes are moist.      Pharynx: Oropharynx is clear. Eyes:      General: No scleral icterus.      Conjunctiva/sclera: Conjunctivae normal.   Neck:      Thyroid: No thyromegaly. Vascular: No carotid bruit or JVD. Cardiovascular:      Rate and Rhythm: Normal rate and regular rhythm. Heart sounds: Normal heart sounds. No murmur heard. Pulmonary:      Effort: Pulmonary effort is normal. No respiratory distress. Breath sounds: Normal breath sounds. No wheezing or rales. Abdominal:      General: Bowel sounds are normal. There is no distension. Palpations: Abdomen is soft. Tenderness: There is no abdominal tenderness. There is no right CVA tenderness or left CVA tenderness. Comments: obese   Musculoskeletal:         General: Tenderness (Right ankle, chronic) present. Cervical back: Normal range of motion and neck supple. Right lower leg: No edema. Left lower leg: No edema. Comments: DJD   Skin:     General: Skin is warm and dry. Findings: No rash. Neurological:      General: No focal deficit present. Mental Status: He is alert and oriented to person, place, and time. Coordination: Coordination normal.      Gait: Gait normal.   Psychiatric:         Behavior: Behavior normal.         ASSESSMENT and PLAN  Diagnoses and all orders for this visit:    1. Essential hypertension  2. Acquired hypothyroidism  3. Hypercholesterolemia  4. Prediabetes  5. Insomnia, unspecified type  6. Overweight  Advised patient to lose weight by watching diet (decreasing sugars/carbs/fat, increasing fruits/vegetables), exercising at least 30 minutes daily, getting 7-8 hours of sleep daily, drinking plenty of water, and decreasing stress  7. Smoker  Advised pt strongly to stop smoking     All chronic medical problems are stable  Continue with current medical management and plan  All prescriptions are up-to-date  Follow-up and Dispositions    · Return in about 6 months (around 3/29/2022).        lab results and schedule of future lab studies reviewed with patient  reviewed diet, exercise and weight control  reviewed medications and side effects in detail  F/u with other MD's/ providers as scheduled  COVID-19 precautions discussed with pt  An After Visit Summary was printed and given to the patient.

## 2021-09-29 NOTE — PROGRESS NOTES
Identified pt with two pt identifiers(name and ). Reviewed record in preparation for visit and have obtained necessary documentation. Chief Complaint   Patient presents with    Hypertension     f/u    Cholesterol Problem    Thyroid Problem        Vitals:    21 1051   BP: 128/84   Pulse: 88   Resp: 16   Temp: 98.1 °F (36.7 °C)   TempSrc: Oral   SpO2: 96%   Weight: 214 lb (97.1 kg)   Height: 5' 11\" (1.803 m)   PainSc:   0 - No pain       Health Maintenance Due   Topic    COVID-19 Vaccine (1)    DTaP/Tdap/Td series (1 - Tdap)    Shingrix Vaccine Age 49> (1 of 2)    Low dose CT lung screening     AAA Screening 73-67 YO Male Smoking Patients     Flu Vaccine (1)       Coordination of Care Questionnaire:  :   1) Have you been to an emergency room, urgent care, or hospitalized since your last visit? If yes, where when, and reason for visit? no       2. Have seen or consulted any other health care provider since your last visit? If yes, where when, and reason for visit? YES  - skin bx per Dr. Juanito Marie    Patient is accompanied by self I have received verbal consent from Rosetta Posadas to discuss any/all medical information while they are present in the room.

## 2021-10-23 DIAGNOSIS — E03.9 ACQUIRED HYPOTHYROIDISM: ICD-10-CM

## 2021-10-25 RX ORDER — LEVOTHYROXINE SODIUM 137 UG/1
TABLET ORAL
Qty: 90 TABLET | Refills: 0 | Status: SHIPPED | OUTPATIENT
Start: 2021-10-25 | End: 2022-01-20 | Stop reason: SDUPTHER

## 2022-01-20 DIAGNOSIS — E03.9 ACQUIRED HYPOTHYROIDISM: ICD-10-CM

## 2022-01-20 RX ORDER — LEVOTHYROXINE SODIUM 137 UG/1
137 TABLET ORAL
Qty: 90 TABLET | Refills: 0 | Status: SHIPPED | OUTPATIENT
Start: 2022-01-20 | End: 2022-04-21 | Stop reason: SDUPTHER

## 2022-02-25 RX ORDER — FELODIPINE 5 MG/1
TABLET, EXTENDED RELEASE ORAL
Qty: 90 TABLET | Refills: 1 | Status: SHIPPED | OUTPATIENT
Start: 2022-02-25 | End: 2022-05-31 | Stop reason: SDUPTHER

## 2022-02-25 RX ORDER — SIMVASTATIN 40 MG/1
TABLET, FILM COATED ORAL
Qty: 90 TABLET | Refills: 1 | Status: SHIPPED | OUTPATIENT
Start: 2022-02-25 | End: 2022-05-31 | Stop reason: SDUPTHER

## 2022-03-18 PROBLEM — Z87.81 HISTORY OF FRACTURE OF RIGHT ANKLE: Status: ACTIVE | Noted: 2017-01-09

## 2022-03-18 PROBLEM — E66.3 OVERWEIGHT: Status: ACTIVE | Noted: 2020-01-17

## 2022-03-18 PROBLEM — R73.03 PREDIABETES: Status: ACTIVE | Noted: 2021-03-05

## 2022-03-18 PROBLEM — G47.00 INSOMNIA: Status: ACTIVE | Noted: 2018-01-09

## 2022-03-19 PROBLEM — R73.9 HYPERGLYCEMIA: Status: ACTIVE | Noted: 2019-07-12

## 2022-03-19 PROBLEM — E78.00 HYPERCHOLESTEROLEMIA: Status: ACTIVE | Noted: 2017-01-09

## 2022-03-19 PROBLEM — I10 ESSENTIAL HYPERTENSION: Status: ACTIVE | Noted: 2017-01-09

## 2022-03-19 PROBLEM — E03.9 ACQUIRED HYPOTHYROIDISM: Status: ACTIVE | Noted: 2017-01-09

## 2022-03-19 PROBLEM — E66.9 OBESITY (BMI 30.0-34.9): Status: ACTIVE | Noted: 2017-01-09

## 2022-03-19 PROBLEM — M65.321 TRIGGER INDEX FINGER OF RIGHT HAND: Status: ACTIVE | Noted: 2018-07-10

## 2022-03-19 PROBLEM — K63.5 COLON POLYPS: Status: ACTIVE | Noted: 2017-01-09

## 2022-03-19 PROBLEM — L82.1 SEBORRHEIC KERATOSES: Status: ACTIVE | Noted: 2017-07-10

## 2022-03-19 PROBLEM — C44.91 BASAL CELL CARCINOMA: Status: ACTIVE | Noted: 2018-01-09

## 2022-03-19 PROBLEM — E66.811 OBESITY (BMI 30.0-34.9): Status: ACTIVE | Noted: 2017-01-09

## 2022-03-20 PROBLEM — F17.200 SMOKER: Status: ACTIVE | Noted: 2017-01-09

## 2022-03-30 ENCOUNTER — OFFICE VISIT (OUTPATIENT)
Dept: INTERNAL MEDICINE CLINIC | Age: 67
End: 2022-03-30
Payer: COMMERCIAL

## 2022-03-30 VITALS
OXYGEN SATURATION: 97 % | RESPIRATION RATE: 16 BRPM | SYSTOLIC BLOOD PRESSURE: 123 MMHG | TEMPERATURE: 98.1 F | DIASTOLIC BLOOD PRESSURE: 87 MMHG | WEIGHT: 217 LBS | HEART RATE: 78 BPM | HEIGHT: 71 IN | BODY MASS INDEX: 30.38 KG/M2

## 2022-03-30 DIAGNOSIS — R73.03 PREDIABETES: ICD-10-CM

## 2022-03-30 DIAGNOSIS — I10 ESSENTIAL HYPERTENSION: ICD-10-CM

## 2022-03-30 DIAGNOSIS — F17.200 SMOKER: ICD-10-CM

## 2022-03-30 DIAGNOSIS — E66.9 OBESITY (BMI 30.0-34.9): ICD-10-CM

## 2022-03-30 DIAGNOSIS — E78.00 HYPERCHOLESTEROLEMIA: ICD-10-CM

## 2022-03-30 DIAGNOSIS — E03.9 ACQUIRED HYPOTHYROIDISM: Primary | ICD-10-CM

## 2022-03-30 PROCEDURE — 99214 OFFICE O/P EST MOD 30 MIN: CPT | Performed by: INTERNAL MEDICINE

## 2022-03-30 NOTE — PROGRESS NOTES
Health Maintenance Due   Topic Date Due    COVID-19 Vaccine (1) Never done    DTaP/Tdap/Td series (1 - Tdap) Never done    Shingrix Vaccine Age 50> (1 of 2) Never done    Low dose CT lung screening  Never done    AAA Screening 73-67 YO Male Smoking Patients  Never done    Pneumococcal 65+ years (2 of 2 - PPSV23) 01/09/2022       Chief Complaint   Patient presents with    Hypertension    Hypothyroidism    Other     6m f/u       1. Have you been to the ER, urgent care clinic since your last visit? Hospitalized since your last visit? No    2. Have you seen or consulted any other health care providers outside of the [address] [place] [address] since your last visit? Include any pap smears or colon screening. No    3) Do you have an Advance Directive on file? no    4) Are you interested in receiving information on Advance Directives? NO      Patient is accompanied by self I have received verbal consent from Erick Crowley to discuss any/all medical information while they are present in the room.

## 2022-04-21 DIAGNOSIS — E03.9 ACQUIRED HYPOTHYROIDISM: ICD-10-CM

## 2022-04-21 RX ORDER — LEVOTHYROXINE SODIUM 137 UG/1
137 TABLET ORAL
Qty: 90 TABLET | Refills: 0 | Status: SHIPPED | OUTPATIENT
Start: 2022-04-21 | End: 2022-05-31 | Stop reason: SDUPTHER

## 2022-04-26 NOTE — PROGRESS NOTES
HISTORY OF PRESENT ILLNESS  Emily Hall is a 74 y.o. male. Pt. comes in for f/u. PMH includes HTN, hypothyroidism, HLD, prediabetes, insomnia. Vital signs are stable. He has gained more weight. BMI is 30.3. Overall has been feeling well. Reports all chronic medical issues being stable current medications. Continues to smoke daily. Drinks alcohol socially. Reports occasional issues with insomnia. Not using trazodone anymore. Reports compliance with medications and diet. Med list and most recent labs/studies reviewed with pt. A1c 6.0. Lipids and TSH are stable. Has not been as active physically. He is still working part-time. Has had Covid vaccination. Denies any related issues. Reports no other new c/o. HPI    Review of Systems   Constitutional: Negative. HENT: Negative. Eyes: Negative for blurred vision. Respiratory: Negative for shortness of breath. Cardiovascular: Negative for chest pain and leg swelling. Gastrointestinal: Negative for abdominal pain and heartburn. Genitourinary: Negative for dysuria. Musculoskeletal: Positive for joint pain. Negative for back pain and falls. Skin: Negative. Neurological: Negative for dizziness, sensory change, focal weakness and headaches. Endo/Heme/Allergies: Negative. Negative for polydipsia. Psychiatric/Behavioral: Negative for depression. The patient has insomnia. The patient is not nervous/anxious. All other systems reviewed and are negative. Physical Exam  Vitals and nursing note reviewed. Constitutional:       General: He is not in acute distress. Appearance: He is well-developed. Comments: Pleasant  Overweight     HENT:      Head: Normocephalic and atraumatic. Mouth/Throat:      Mouth: Mucous membranes are moist.      Pharynx: Oropharynx is clear. Eyes:      General: No scleral icterus. Conjunctiva/sclera: Conjunctivae normal.   Neck:      Thyroid: No thyromegaly.       Vascular: No carotid bruit or JVD. Cardiovascular:      Rate and Rhythm: Normal rate and regular rhythm. Heart sounds: Normal heart sounds. No murmur heard. Pulmonary:      Effort: Pulmonary effort is normal. No respiratory distress. Breath sounds: Normal breath sounds. No wheezing or rales. Abdominal:      General: Bowel sounds are normal. There is no distension. Palpations: Abdomen is soft. Tenderness: There is no abdominal tenderness. There is no right CVA tenderness or left CVA tenderness. Comments: obese   Musculoskeletal:         General: Tenderness (Right ankle, chronic) present. Cervical back: Normal range of motion and neck supple. Right lower leg: No edema. Left lower leg: No edema. Comments: DJD   Skin:     General: Skin is warm and dry. Findings: No rash. Neurological:      General: No focal deficit present. Mental Status: He is alert and oriented to person, place, and time. Coordination: Coordination normal.      Gait: Gait normal.   Psychiatric:         Behavior: Behavior normal.         ASSESSMENT and PLAN  Diagnoses and all orders for this visit:    1. Acquired hypothyroidism  -     TSH 3RD GENERATION; Future    2. Essential hypertension  -     LIPID PANEL; Future  -     METABOLIC PANEL, COMPREHENSIVE; Future  -     CBC WITH AUTOMATED DIFF; Future    3. Hypercholesterolemia  -     LIPID PANEL; Future  -     METABOLIC PANEL, COMPREHENSIVE; Future  -     CBC WITH AUTOMATED DIFF; Future    4. Prediabetes  -     LIPID PANEL; Future  -     METABOLIC PANEL, COMPREHENSIVE; Future  -     HEMOGLOBIN A1C WITH EAG; Future    5. Smoker  Advised pt strongly to stop smoking     6. Obesity (BMI 30.0-34. 9)  Advised patient to lose weight by watching diet (decreasing sugars/carbs/fat, increasing fruits/vegetables), exercising at least 30 minutes daily, getting 7-8 hours of sleep daily, drinking plenty of water, and decreasing stress      Follow-up and Dispositions    · Return in about 6 months (around 9/30/2022). All chronic medical problems are stable  Continue with current medical management and plan  lab results and schedule of future lab studies reviewed with patient  reviewed diet, exercise and weight control  reviewed medications and side effects in detail  F/u with other MD's/ providers as scheduled  COVID-19 precautions discussed with pt  An After Visit Summary was printed and given to the patient.

## 2022-09-20 LAB
ALBUMIN SERPL-MCNC: 4.4 G/DL (ref 3.8–4.8)
ALBUMIN/GLOB SERPL: 1.8 {RATIO} (ref 1.2–2.2)
ALP SERPL-CCNC: 75 IU/L (ref 44–121)
ALT SERPL-CCNC: 14 IU/L (ref 0–44)
AST SERPL-CCNC: 28 IU/L (ref 0–40)
BASOPHILS # BLD AUTO: 0.1 X10E3/UL (ref 0–0.2)
BASOPHILS NFR BLD AUTO: 1 %
BILIRUB SERPL-MCNC: 0.4 MG/DL (ref 0–1.2)
BUN SERPL-MCNC: 12 MG/DL (ref 8–27)
BUN/CREAT SERPL: 13 (ref 10–24)
CALCIUM SERPL-MCNC: 9.4 MG/DL (ref 8.6–10.2)
CHLORIDE SERPL-SCNC: 103 MMOL/L (ref 96–106)
CHOLEST SERPL-MCNC: 156 MG/DL (ref 100–199)
CO2 SERPL-SCNC: 23 MMOL/L (ref 20–29)
CREAT SERPL-MCNC: 0.93 MG/DL (ref 0.76–1.27)
EGFR: 89 ML/MIN/1.73
EOSINOPHIL # BLD AUTO: 0.4 X10E3/UL (ref 0–0.4)
EOSINOPHIL NFR BLD AUTO: 4 %
ERYTHROCYTE [DISTWIDTH] IN BLOOD BY AUTOMATED COUNT: 13.1 % (ref 11.6–15.4)
EST. AVERAGE GLUCOSE BLD GHB EST-MCNC: 128 MG/DL
GLOBULIN SER CALC-MCNC: 2.5 G/DL (ref 1.5–4.5)
GLUCOSE SERPL-MCNC: 118 MG/DL (ref 65–99)
HBA1C MFR BLD: 6.1 % (ref 4.8–5.6)
HCT VFR BLD AUTO: 48.6 % (ref 37.5–51)
HDLC SERPL-MCNC: 42 MG/DL
HGB BLD-MCNC: 16.6 G/DL (ref 13–17.7)
IMM GRANULOCYTES # BLD AUTO: 0.1 X10E3/UL (ref 0–0.1)
IMM GRANULOCYTES NFR BLD AUTO: 1 %
IMP & REVIEW OF LAB RESULTS: NORMAL
LDLC SERPL CALC-MCNC: 95 MG/DL (ref 0–99)
LYMPHOCYTES # BLD AUTO: 2.4 X10E3/UL (ref 0.7–3.1)
LYMPHOCYTES NFR BLD AUTO: 26 %
MCH RBC QN AUTO: 30.8 PG (ref 26.6–33)
MCHC RBC AUTO-ENTMCNC: 34.2 G/DL (ref 31.5–35.7)
MCV RBC AUTO: 90 FL (ref 79–97)
MONOCYTES # BLD AUTO: 0.5 X10E3/UL (ref 0.1–0.9)
MONOCYTES NFR BLD AUTO: 6 %
NEUTROPHILS # BLD AUTO: 5.7 X10E3/UL (ref 1.4–7)
NEUTROPHILS NFR BLD AUTO: 62 %
PLATELET # BLD AUTO: 240 X10E3/UL (ref 150–450)
POTASSIUM SERPL-SCNC: 4.5 MMOL/L (ref 3.5–5.2)
PROT SERPL-MCNC: 6.9 G/DL (ref 6–8.5)
RBC # BLD AUTO: 5.39 X10E6/UL (ref 4.14–5.8)
SODIUM SERPL-SCNC: 140 MMOL/L (ref 134–144)
TRIGL SERPL-MCNC: 103 MG/DL (ref 0–149)
TSH SERPL DL<=0.005 MIU/L-ACNC: 0.96 UIU/ML (ref 0.45–4.5)
VLDLC SERPL CALC-MCNC: 19 MG/DL (ref 5–40)
WBC # BLD AUTO: 9.1 X10E3/UL (ref 3.4–10.8)

## 2022-09-28 ENCOUNTER — OFFICE VISIT (OUTPATIENT)
Dept: INTERNAL MEDICINE CLINIC | Age: 67
End: 2022-09-28
Payer: MEDICARE

## 2022-09-28 VITALS
WEIGHT: 215 LBS | OXYGEN SATURATION: 97 % | BODY MASS INDEX: 30.1 KG/M2 | RESPIRATION RATE: 16 BRPM | HEIGHT: 71 IN | DIASTOLIC BLOOD PRESSURE: 76 MMHG | SYSTOLIC BLOOD PRESSURE: 138 MMHG | HEART RATE: 81 BPM | TEMPERATURE: 98.1 F

## 2022-09-28 DIAGNOSIS — I10 ESSENTIAL HYPERTENSION: ICD-10-CM

## 2022-09-28 DIAGNOSIS — R73.03 PREDIABETES: ICD-10-CM

## 2022-09-28 DIAGNOSIS — E03.9 ACQUIRED HYPOTHYROIDISM: Primary | ICD-10-CM

## 2022-09-28 DIAGNOSIS — E78.00 HYPERCHOLESTEROLEMIA: ICD-10-CM

## 2022-09-28 DIAGNOSIS — E66.9 OBESITY (BMI 30.0-34.9): ICD-10-CM

## 2022-09-28 PROCEDURE — 99214 OFFICE O/P EST MOD 30 MIN: CPT | Performed by: INTERNAL MEDICINE

## 2022-09-28 PROCEDURE — G0463 HOSPITAL OUTPT CLINIC VISIT: HCPCS | Performed by: INTERNAL MEDICINE

## 2022-09-28 NOTE — PROGRESS NOTES
HISTORY OF PRESENT ILLNESS  Tomas Franks is a 69 y.o. male. Pt. comes in for f/u. Has a few chronic medical issues as documented. Vital signs and weight are stable. BMI 30. All chronic medical issues are stable on current treatment regimen. Has had Covid-19 vaccination. Reports taking proper precautions. Denies any related signs or symptoms. PMH/PSH/Allergies/Social History/medication list and most recent studies reviewed with patient. Tobacco use: Yes. Daily. Alcohol use: Social    Reports compliance with medications and diet. Trying to be active physically to control weight. Reports no other new c/o. Past Medical History:   Diagnosis Date    Cancer ([facility] [address]     basal cell skin cancer x 2    Diverticulosis     History of colonic polyps     Tubular adenomas excised colonoscopically on 9/26/2011. Hypercholesterolemia     Hypertension     Hypothyroidism      No Known Allergies  Current Outpatient Medications on File Prior to Visit   Medication Sig Dispense Refill    levothyroxine (Synthroid) 137 mcg tablet Take 1 Tablet by mouth Daily (before breakfast). 90 Tablet 1    simvastatin (ZOCOR) 40 mg tablet Take 1 Tablet by mouth nightly. 90 Tablet 1    felodipine (PLENDIL SR) 5 mg 24 hr tablet Take 1 Tablet by mouth daily. 90 Tablet 1     No current facility-administered medications on file prior to visit. HPI    Review of Systems   Constitutional: Negative. HENT: Negative. Eyes:  Negative for blurred vision. Respiratory:  Negative for shortness of breath. Cardiovascular:  Negative for chest pain and leg swelling. Gastrointestinal:  Negative for abdominal pain and heartburn. Genitourinary:  Negative for dysuria. Musculoskeletal:  Positive for joint pain. Negative for back pain and falls. Skin: Negative. Neurological:  Negative for dizziness, sensory change, focal weakness and headaches. Endo/Heme/Allergies: Negative. Negative for polydipsia.    Psychiatric/Behavioral: Negative for depression. The patient is not nervous/anxious and does not have insomnia. All other systems reviewed and are negative. Physical Exam  Vitals and nursing note reviewed. Constitutional:       General: He is not in acute distress. Appearance: He is well-developed. Comments: Pleasant  Overweight     HENT:      Head: Normocephalic and atraumatic. Mouth/Throat:      Mouth: Mucous membranes are moist.      Pharynx: Oropharynx is clear. Eyes:      General: No scleral icterus. Conjunctiva/sclera: Conjunctivae normal.   Neck:      Thyroid: No thyromegaly. Vascular: No carotid bruit or JVD. Cardiovascular:      Rate and Rhythm: Normal rate and regular rhythm. Heart sounds: Normal heart sounds. No murmur heard. Pulmonary:      Effort: Pulmonary effort is normal. No respiratory distress. Breath sounds: Normal breath sounds. No wheezing or rales. Abdominal:      General: Bowel sounds are normal. There is no distension. Palpations: Abdomen is soft. Tenderness: There is no abdominal tenderness. There is no right CVA tenderness or left CVA tenderness. Comments: obese   Musculoskeletal:         General: Tenderness (Right ankle, chronic) present. Cervical back: Normal range of motion and neck supple. Right lower leg: No edema. Left lower leg: No edema. Comments: DJD   Skin:     General: Skin is warm and dry. Findings: No rash. Neurological:      General: No focal deficit present. Mental Status: He is alert and oriented to person, place, and time. Coordination: Coordination normal.      Gait: Gait normal.   Psychiatric:         Behavior: Behavior normal.       ASSESSMENT and PLAN    ICD-10-CM ICD-9-CM    1. Acquired hypothyroidism  E03.9 244.9 Continue Synthroid. TSH is normal.      2. Hypercholesterolemia  E78.00 272.0 Continue Zocor. Lipids are stable. 3. Essential hypertension  I10 401.9 Continue Plendil.   BP is stable. 4. Prediabetes  R73.03 790.29 Do's and don'ts of prediabetes discussed. A1c is 6.1.      5. Obesity (BMI 30.0-34. 9)  E66.9 278.00 Advised patient to lose weight by watching diet (decreasing sugars/carbs/fat, increasing fruits/vegetables), exercising at least 30 minutes daily, getting 7-8 hours of sleep daily, drinking plenty of water, and decreasing stress          Follow-up and Dispositions    Return in about 6 months (around 3/28/2023). All chronic medical problems are stable  Continue with current medical management and plan  lab results and schedule of future lab studies reviewed with patient  reviewed diet, exercise and weight control  reviewed medications and side effects in detail  F/u with other MD's/ providers as scheduled  COVID-19 precautions discussed with pt  An After Visit Summary was printed and given to the patient.

## 2022-09-28 NOTE — PROGRESS NOTES
Health Maintenance Due   Topic Date Due    COVID-19 Vaccine (1) Never done    DTaP/Tdap/Td series (1 - Tdap) Never done    Shingrix Vaccine Age 50> (1 of 2) Never done    Low dose CT lung screening  Never done    AAA Screening 73-69 YO Male Smoking Patients  Never done    Pneumococcal 65+ years (3 - PPSV23 or PCV20) 01/09/2022    Flu Vaccine (1) 08/01/2022       Chief Complaint   Patient presents with    Hypertension    Hypothyroidism       1. Have you been to the ER, urgent care clinic since your last visit? Hospitalized since your last visit? No    2. Have you seen or consulted any other health care providers outside of the [address] [place] [address] since your last visit? Include any pap smears or colon screening. No    3) Do you have an Advance Directive on file? no    4) Are you interested in receiving information on Advance Directives? NO      Patient is accompanied by self I have received verbal consent from Ed Lopez to discuss any/all medical information while they are present in the room.

## 2023-03-29 ENCOUNTER — VIRTUAL VISIT (OUTPATIENT)
Dept: INTERNAL MEDICINE CLINIC | Age: 68
End: 2023-03-29
Payer: MEDICARE

## 2023-03-29 DIAGNOSIS — R73.03 PREDIABETES: ICD-10-CM

## 2023-03-29 DIAGNOSIS — E78.00 HYPERCHOLESTEROLEMIA: Primary | ICD-10-CM

## 2023-03-29 DIAGNOSIS — I10 ESSENTIAL HYPERTENSION: ICD-10-CM

## 2023-03-29 DIAGNOSIS — F17.200 SMOKER: ICD-10-CM

## 2023-03-29 DIAGNOSIS — Z00.00 MEDICARE ANNUAL WELLNESS VISIT, INITIAL: ICD-10-CM

## 2023-03-29 DIAGNOSIS — E03.9 ACQUIRED HYPOTHYROIDISM: ICD-10-CM

## 2023-03-29 PROCEDURE — G0463 HOSPITAL OUTPT CLINIC VISIT: HCPCS | Performed by: INTERNAL MEDICINE

## 2023-03-29 PROCEDURE — G8510 SCR DEP NEG, NO PLAN REQD: HCPCS | Performed by: INTERNAL MEDICINE

## 2023-03-29 PROCEDURE — G0438 PPPS, INITIAL VISIT: HCPCS | Performed by: INTERNAL MEDICINE

## 2023-03-29 PROCEDURE — 1101F PT FALLS ASSESS-DOCD LE1/YR: CPT | Performed by: INTERNAL MEDICINE

## 2023-03-29 PROCEDURE — G8536 NO DOC ELDER MAL SCRN: HCPCS | Performed by: INTERNAL MEDICINE

## 2023-03-29 PROCEDURE — 99214 OFFICE O/P EST MOD 30 MIN: CPT | Performed by: INTERNAL MEDICINE

## 2023-03-29 PROCEDURE — 3017F COLORECTAL CA SCREEN DOC REV: CPT | Performed by: INTERNAL MEDICINE

## 2023-03-29 PROCEDURE — G8417 CALC BMI ABV UP PARAM F/U: HCPCS | Performed by: INTERNAL MEDICINE

## 2023-03-29 PROCEDURE — G8427 DOCREV CUR MEDS BY ELIG CLIN: HCPCS | Performed by: INTERNAL MEDICINE

## 2023-03-29 NOTE — PROGRESS NOTES
Jessie Colvin (: 1955) is a 69 y.o. male, established patient, here for evaluation of the following chief complaint(s):   Cholesterol Problem (6 month follow up)       ASSESSMENT/PLAN:  Below is the assessment and plan developed based on review of pertinent history, labs, studies, and medications. 1. Hypercholesterolemia  -     LIPID PANEL; Future  -     METABOLIC PANEL, COMPREHENSIVE; Future  2. Acquired hypothyroidism  -     TSH 3RD GENERATION; Future  3. Essential hypertension  Monitor BP at home with goal of 140/90 or less   Stable chronic condition. Continue current treatment/medications. 4. Prediabetes  -     HEMOGLOBIN A1C WITH EAG; Future  5. Medicare annual wellness visit, initial  6. Smoker  -     CT LOW DOSE LUNG CANCER SCREENING; Future      No follow-ups on file. SUBJECTIVE/OBJECTIVE:  Pt. comes in for f/u. Has a few chronic medical issues as documented. Reports doing well overall. Still working as a [employer]. Lives with his wife. Has gained a few pounds. All chronic medical issues are stable on current treatment regimen. Denies any issues with  Covid-19. Had [employer] vaccination. PMH/PSH/Allergies/Social History/medication list and most recent studies reviewed with patient. Most recent labs were stable. Tobacco use: 1 pack daily for many years. Denies any respiratory issues. No chest pain. alcohol use: No   reports compliance with medications and diet. Trying to be active physically to control weight. Reports no other new c/o. Past Medical History:   Diagnosis Date    Cancer ([facility] [address]     basal cell skin cancer x 2    Diverticulosis     History of colonic polyps     Tubular adenomas excised colonoscopically on 2011.     Hypercholesterolemia     Hypertension     Hypothyroidism        No Known Allergies    Current Outpatient Medications on File Prior to Visit   Medication Sig Dispense Refill    felodipine (PLENDIL SR) 5 mg 24 hr tablet TAKE 1 TABLET DAILY 90 Tablet 1    simvastatin (ZOCOR) 40 mg tablet TAKE 1 TABLET NIGHTLY 90 Tablet 1    levothyroxine (SYNTHROID) 137 mcg tablet TAKE 1 TABLET DAILY BEFORE BREAKFAST 90 Tablet 1     No current facility-administered medications on file prior to visit. There were no vitals taken for this visit.           Physical Exam    [INSTRUCTIONS:  \"[x]\" Indicates a positive item  \"[]\" Indicates a negative item  -- DELETE ALL ITEMS NOT EXAMINED]    Constitutional: [x] Appears well-developed and well-nourished [x] No apparent distress      [] Abnormal -     Mental status: [x] Alert and awake  [x] Oriented to person/place/time [x] Able to follow commands    [] Abnormal -     Eyes:   EOM    [x]  Normal    [] Abnormal -   Sclera  [x]  Normal    [] Abnormal -          Discharge [x]  None visible   [] Abnormal -     HENT: [x] Normocephalic, atraumatic  [] Abnormal -   [x] Mouth/Throat: Mucous membranes are moist    External Ears [x] Normal  [] Abnormal -    Neck: [x] No visualized mass [] Abnormal -     Pulmonary/Chest: [x] Respiratory effort normal   [x] No visualized signs of difficulty breathing or respiratory distress        [] Abnormal -      Musculoskeletal:   [x] Normal gait with no signs of ataxia         [x] Normal range of motion of neck        [] Abnormal -     Neurological:        [x] No Facial Asymmetry (Cranial nerve 7 motor function) (limited exam due to video visit)          [x] No gaze palsy        [] Abnormal -          Skin:        [x] No significant exanthematous lesions or discoloration noted on facial skin         [] Abnormal -            Psychiatric:       [x] Normal Affect [] Abnormal -        [x] No Hallucinations    Other pertinent observable physical exam findings:-    On this date 03/29/2023 I have spent 30 minutes reviewing previous notes, test results and face to face (virtual) with the patient discussing the diagnosis and importance of compliance with the treatment plan as well as documenting on the day of the visit. Castro Huff, was evaluated through a synchronous (real-time) audio-video encounter. The patient (or guardian if applicable) is aware that this is a billable service, which includes applicable co-pays. This Virtual Visit was conducted with patient's (and/or legal guardian's) consent. The visit was conducted pursuant to the emergency declaration under the [facility], [address] [place] [address] authority and the [employer] General Act. Patient identification was verified, and a caregiver was present when appropriate. The patient was located at: Home: [address] [phone]  The provider was located at: Home: [address] [facility] was used to authenticate this note.   -- Martha Bundy DO

## 2023-03-29 NOTE — PROGRESS NOTES
Rm    Chief Complaint   Patient presents with    Cholesterol Problem     6 month follow up        There were no vitals taken for this visit. 1. Have you been to the ER, urgent care clinic since your last visit? Hospitalized since your last visit? No    2. Have you seen or consulted any other health care providers outside of the [address] [place] [address] since your last visit? Include any pap smears or colon screening. No     Health Maintenance Due   Topic Date Due    COVID-19 Vaccine (1) Never done    DTaP/Tdap/Td series (1 - Tdap) Never done    Shingles Vaccine (1 of 2) Never done    Low dose CT lung screening  Never done    AAA Screening 73-69 YO Male Smoking Patients  Never done    Pneumococcal 65+ years (3 - PPSV23 if available, else PCV20) 01/09/2022    Flu Vaccine (1) 08/01/2022    Medicare Yearly Exam  Never done        3 most recent PHQ Screens 3/29/2023   Little interest or pleasure in doing things Not at all   Feeling down, depressed, irritable, or hopeless Not at all   Total Score PHQ 2 0        Fall Risk Assessment, last 12 mths 3/29/2023   Able to walk? Yes   Fall in past 12 months? 0   Do you feel unsteady?  0   Are you worried about falling 0       Learning Assessment 1/9/2017   PRIMARY LEARNER Patient   BARRIERS PRIMARY LEARNER NONE   PRIMARY LANGUAGE ENGLISH   LEARNER PREFERENCE PRIMARY LISTENING   ANSWERED BY PATIENT   RELATIONSHIP SELF

## 2023-03-29 NOTE — PROGRESS NOTES
Schedule of Personalized Health Plan  (Provide Copy to Patient)  The best way to stay healthy is to live a healthy lifestyle. A healthy lifestyle includes regular exercise, eating a well-balanced diet, keeping a healthy weight and not smoking. Regular physical exams and screening tests are another important way to take care of yourself. Preventive exams provided by health care providers can find health problems early when treatment works best and can keep you from getting certain diseases or illnesses. Preventive services include exams, lab tests, screenings, shots, monitoring and information to help you take care of your own health. All people over 65 should have a pneumonia shot. Pneumonia shots are usually only needed once in a lifetime unless your doctor decides differently. All people over 65 should have a yearly flu shot. People over 65 are at medium to high risk for Hepatitis B. Three shots are needed for complete protection. In addition to your physical exam, some screening tests are recommended:    Bone mass measurement (dexa scan) is recommended every two years if you have certain risk factors, such as personal history of vertebral fracture or chronic steroid medication use    Diabetes Mellitus screening is recommended every year. Glaucoma is an eye disease caused by high pressure in the eye. An eye exam is recommended every year. Cardiovascular screening tests that check your cholesterol and other blood fat (lipid) levels are recommended every five years. Colorectal Cancer screening tests help to find pre-cancerous polyps (growths in the colon) so they can be removed before they turn into cancer. Tests ordered for screening depend on your personal and family history risk factors.     Screening for Prostate Cancer is recommended yearly with a digital rectal exam and/or a PSA test    Here is a list of your current Health Maintenance items with a due date:  Health Maintenance Topic Date Due    COVID-19 Vaccine (1) Never done    DTaP/Tdap/Td series (1 - Tdap) Never done    Shingles Vaccine (1 of 2) Never done    Low dose CT lung screening  Never done    AAA Screening 73-69 YO Male Smoking Patients  Never done    Pneumococcal 65+ years (3 - PPSV23 if available, else PCV20) 01/09/2022    Flu Vaccine (1) 08/01/2022    A1C test (Diabetic or Prediabetic)  09/19/2023    Lipid Screen  09/19/2023    Depression Screen  09/28/2023    Medicare Yearly Exam  03/29/2024    Colorectal Cancer Screening Combo  10/05/2026    Hepatitis C Screening  Completed       This is an Initial Medicare Annual Wellness Exam (AWV) (Performed 12 months after IPPE or effective date of Medicare Part B enrollment, Once in a lifetime)    I have reviewed the patient's medical history in detail and updated the computerized patient record. Assessment/Plan   Education and counseling provided:  Are appropriate based on today's review and evaluation    1. Hypercholesterolemia  -     LIPID PANEL; Future  -     METABOLIC PANEL, COMPREHENSIVE; Future  2. Acquired hypothyroidism  -     TSH 3RD GENERATION; Future  3. Essential hypertension  4. Prediabetes  -     HEMOGLOBIN A1C WITH EAG; Future  5. Medicare annual wellness visit, initial  6. Smoker  -     CT LOW DOSE LUNG CANCER SCREENING; Future       Depression Risk Factor Screening     3 most recent PHQ Screens 3/29/2023   Little interest or pleasure in doing things Not at all   Feeling down, depressed, irritable, or hopeless Not at all   Total Score PHQ 2 0       Alcohol & Drug Abuse Risk Screen    Do you average more than 1 drink per night or more than 7 drinks a week: No    In the past three months have you have had more than 4 drinks containing alcohol on one occasion: No          Functional Ability and Level of Safety    Hearing: Hearing is good. Activities of Daily Living: The home contains: no safety equipment.   Patient does total self care     Ambulation: with no difficulty      Fall Risk:  Fall Risk Assessment, last 12 mths 3/29/2023   Able to walk? Yes   Fall in past 12 months? 0   Do you feel unsteady? 0   Are you worried about falling 0      Abuse Screen:  Patient is not abused       Cognitive Screening    Has your family/caregiver stated any concerns about your memory: no     Cognitive Screening: A+O x 3    Health Maintenance Due     Health Maintenance Due   Topic Date Due    COVID-19 Vaccine (1) Never done    DTaP/Tdap/Td series (1 - Tdap) Never done    Shingles Vaccine (1 of 2) Never done    Low dose CT lung screening  Never done    AAA Screening 73-69 YO Male Smoking Patients  Never done    Pneumococcal 65+ years (3 - PPSV23 if available, else PCV20) 01/09/2022    Flu Vaccine (1) 08/01/2022       Patient Care Team   Patient Care Team:  [facility], DO as PCP - General (Internal Medicine Physician)  [facility], DO as PCP - [facility] Empaneled Provider    History     Patient Active Problem List   Diagnosis Code    Essential hypertension I10    Hypercholesterolemia E78.00    Acquired hypothyroidism E03.9    Obesity (BMI 30.0-34. 9) E66.9    Colon polyps K63.5    Smoker F17.200    History of fracture of right ankle Z87.81    Seborrheic keratoses L82.1    Basal cell carcinoma C44.91    Insomnia G47.00    Trigger index finger of right hand M65.321    Hyperglycemia R73.9    Overweight E66.3    Prediabetes R73.03    Medicare annual wellness visit, initial Z00.00     Past Medical History:   Diagnosis Date    Cancer ([facility] [address]     basal cell skin cancer x 2    Diverticulosis     History of colonic polyps     Tubular adenomas excised colonoscopically on 9/26/2011.     Hypercholesterolemia     Hypertension     Hypothyroidism       Past Surgical History:   Procedure Laterality Date    COLONOSCOPY N/A 10/5/2016    COLONOSCOPY performed by Albert Meade MD at [address] [place] [address] ENDOSCOPY    HX APPENDECTOMY  1980's    HX HEENT      tonsillectomy    HX HEENT  1970's    surgery for broken cheek bone and broken nose    HX ORTHOPAEDIC      ORIF right ankle with ligament reattachment    HX OTHER SURGICAL      surgery for basal cell skin cancer x 2.  One was done in MD office and one in a surgical setting    [address] [facility] [address]  03/2016    hernia repair     Current Outpatient Medications   Medication Sig Dispense Refill    felodipine (PLENDIL SR) 5 mg 24 hr tablet TAKE 1 TABLET DAILY 90 Tablet 1    simvastatin (ZOCOR) 40 mg tablet TAKE 1 TABLET NIGHTLY 90 Tablet 1    levothyroxine (SYNTHROID) 137 mcg tablet TAKE 1 TABLET DAILY BEFORE BREAKFAST 90 Tablet 1     No Known Allergies    Family History   Problem Relation Age of Onset    Colon Cancer Mother 80    Breast Cancer Mother     Cancer Mother     No Known Problems Father      Social History     Tobacco Use    Smoking status: Every Day     Packs/day: 1.00     Years: 42.00     Pack years: 42.00     Types: Cigarettes    Smokeless tobacco: Never   Substance Use Topics    Alcohol use: No       Bin Ennis DO

## 2023-04-21 LAB
ALBUMIN SERPL-MCNC: 4.5 G/DL (ref 3.8–4.8)
ALBUMIN/GLOB SERPL: 2 {RATIO} (ref 1.2–2.2)
ALP SERPL-CCNC: 71 IU/L (ref 44–121)
ALT SERPL-CCNC: 14 IU/L (ref 0–44)
AST SERPL-CCNC: 19 IU/L (ref 0–40)
BILIRUB SERPL-MCNC: 0.5 MG/DL (ref 0–1.2)
BUN SERPL-MCNC: 10 MG/DL (ref 8–27)
BUN/CREAT SERPL: 11 (ref 10–24)
CALCIUM SERPL-MCNC: 9.3 MG/DL (ref 8.6–10.2)
CHLORIDE SERPL-SCNC: 103 MMOL/L (ref 96–106)
CHOLEST SERPL-MCNC: 145 MG/DL (ref 100–199)
CO2 SERPL-SCNC: 25 MMOL/L (ref 20–29)
CREAT SERPL-MCNC: 0.87 MG/DL (ref 0.76–1.27)
EGFRCR SERPLBLD CKD-EPI 2021: 93 ML/MIN/1.73
EST. AVERAGE GLUCOSE BLD GHB EST-MCNC: 134 MG/DL
GLOBULIN SER CALC-MCNC: 2.3 G/DL (ref 1.5–4.5)
GLUCOSE SERPL-MCNC: 115 MG/DL (ref 70–99)
HBA1C MFR BLD: 6.3 % (ref 4.8–5.6)
HDLC SERPL-MCNC: 37 MG/DL
IMP & REVIEW OF LAB RESULTS: NORMAL
LDLC SERPL CALC-MCNC: 83 MG/DL (ref 0–99)
POTASSIUM SERPL-SCNC: 3.9 MMOL/L (ref 3.5–5.2)
PROT SERPL-MCNC: 6.8 G/DL (ref 6–8.5)
SODIUM SERPL-SCNC: 141 MMOL/L (ref 134–144)
TRIGL SERPL-MCNC: 141 MG/DL (ref 0–149)
TSH SERPL DL<=0.005 MIU/L-ACNC: 1 UIU/ML (ref 0.45–4.5)
VLDLC SERPL CALC-MCNC: 25 MG/DL (ref 5–40)

## 2023-04-28 PROBLEM — Z00.00 MEDICARE ANNUAL WELLNESS VISIT, INITIAL: Status: RESOLVED | Noted: 2023-03-29 | Resolved: 2023-04-28

## 2023-04-29 DIAGNOSIS — Z12.2 ENCOUNTER FOR SCREENING FOR LUNG CANCER: ICD-10-CM

## 2023-04-29 DIAGNOSIS — F17.200 TOBACCO DEPENDENCE: ICD-10-CM

## 2023-04-29 DIAGNOSIS — F17.200 SMOKER: Primary | ICD-10-CM

## 2023-05-15 DIAGNOSIS — E03.9 ACQUIRED HYPOTHYROIDISM: Primary | ICD-10-CM

## 2023-05-15 RX ORDER — SIMVASTATIN 40 MG
TABLET ORAL
COMMUNITY
Start: 2023-02-22

## 2023-05-15 RX ORDER — LEVOTHYROXINE SODIUM 137 MCG
TABLET ORAL
COMMUNITY
Start: 2023-02-05 | End: 2023-05-15 | Stop reason: SDUPTHER

## 2023-05-15 RX ORDER — FELODIPINE 5 MG/1
TABLET, EXTENDED RELEASE ORAL
COMMUNITY
Start: 2023-02-22

## 2023-05-15 RX ORDER — LEVOTHYROXINE SODIUM 137 MCG
137 TABLET ORAL DAILY
Qty: 90 TABLET | Refills: 1 | Status: SHIPPED | OUTPATIENT
Start: 2023-05-15

## 2023-05-15 NOTE — TELEPHONE ENCOUNTER
Lov: 3/29/23  No upcoming appointments    Synthroid tabs 137mcg  Qty 90  [facility] 15-Giancarlo-2021 13:00

## 2023-05-23 DIAGNOSIS — I10 ESSENTIAL HYPERTENSION: ICD-10-CM

## 2023-05-23 DIAGNOSIS — E78.00 HYPERCHOLESTEROLEMIA: Primary | ICD-10-CM

## 2023-05-23 RX ORDER — FELODIPINE 5 MG/1
TABLET, EXTENDED RELEASE ORAL
Qty: 90 TABLET | Refills: 3 | Status: SHIPPED | OUTPATIENT
Start: 2023-05-23

## 2023-05-23 RX ORDER — SIMVASTATIN 40 MG
TABLET ORAL
Qty: 90 TABLET | Refills: 3 | Status: SHIPPED | OUTPATIENT
Start: 2023-05-23

## 2023-10-19 ENCOUNTER — IMMUNIZATION (OUTPATIENT)
Age: 68
End: 2023-10-19

## 2023-10-19 DIAGNOSIS — Z23 ENCOUNTER FOR IMMUNIZATION: Primary | ICD-10-CM

## 2023-10-19 NOTE — PROGRESS NOTES
Patient requests flu vaccine; consent form obtained; denies fever, egg allergy nor past reactions to any injection or immunization. Immunization given per protocol and recorded in [address] [place]. VIS information sheet given, explained possible S/E. Reviewed sx indicating need to be seen in ER. Pt had no adverse reaction at time of discharge.   Vernon Ladd RN

## 2023-11-20 ENCOUNTER — HOSPITAL ENCOUNTER (OUTPATIENT)
Facility: HOSPITAL | Age: 68
Discharge: HOME | End: 2023-11-23
Attending: INTERNAL MEDICINE
Payer: MEDICARE

## 2023-11-20 DIAGNOSIS — F17.200 TOBACCO DEPENDENCE: ICD-10-CM

## 2023-11-20 DIAGNOSIS — F17.200 SMOKER: ICD-10-CM

## 2023-11-20 DIAGNOSIS — Z12.2 ENCOUNTER FOR SCREENING FOR LUNG CANCER: ICD-10-CM

## 2023-11-20 PROCEDURE — 71271 CT THORAX LUNG CANCER SCR C-: CPT

## 2024-09-26 ENCOUNTER — TELEPHONE (OUTPATIENT)
Dept: OTHER | Facility: CLINIC | Age: 69
End: 2024-09-26

## 2024-11-24 ENCOUNTER — HOSPITAL ENCOUNTER (OUTPATIENT)
Facility: HOSPITAL | Age: 69
Discharge: HOME | End: 2024-11-27
Attending: INTERNAL MEDICINE
Payer: MEDICARE

## 2024-11-24 DIAGNOSIS — Z87.891 PERSONAL HISTORY OF TOBACCO USE, PRESENTING HAZARDS TO HEALTH: ICD-10-CM

## 2024-11-24 PROCEDURE — 71271 CT THORAX LUNG CANCER SCR C-: CPT

## 2024-12-10 ENCOUNTER — ANESTHESIA EVENT (OUTPATIENT)
Facility: HOSPITAL | Age: 69
End: 2024-12-10
Payer: MEDICARE

## 2024-12-11 ENCOUNTER — ANESTHESIA (OUTPATIENT)
Facility: HOSPITAL | Age: 69
End: 2024-12-11
Payer: MEDICARE

## 2024-12-11 ENCOUNTER — HOSPITAL ENCOUNTER (OUTPATIENT)
Facility: HOSPITAL | Age: 69
Setting detail: OUTPATIENT SURGERY
Discharge: HOME | End: 2024-12-11
Attending: COLON & RECTAL SURGERY | Admitting: COLON & RECTAL SURGERY
Payer: MEDICARE

## 2024-12-11 VITALS
BODY MASS INDEX: 31.08 KG/M2 | DIASTOLIC BLOOD PRESSURE: 88 MMHG | OXYGEN SATURATION: 98 % | TEMPERATURE: 97.5 F | RESPIRATION RATE: 19 BRPM | WEIGHT: 222 LBS | HEART RATE: 69 BPM | HEIGHT: 71 IN | SYSTOLIC BLOOD PRESSURE: 129 MMHG

## 2024-12-11 PROCEDURE — 3700000000 HC ANESTHESIA ATTENDED CARE: Performed by: COLON & RECTAL SURGERY

## 2024-12-11 PROCEDURE — C1889 IMPLANT/INSERT DEVICE, NOC: HCPCS | Performed by: COLON & RECTAL SURGERY

## 2024-12-11 PROCEDURE — 7100000010 HC PHASE II RECOVERY - FIRST 15 MIN: Performed by: COLON & RECTAL SURGERY

## 2024-12-11 PROCEDURE — 6360000002 HC RX W HCPCS: Performed by: NURSE ANESTHETIST, CERTIFIED REGISTERED

## 2024-12-11 PROCEDURE — 2709999900 HC NON-CHARGEABLE SUPPLY: Performed by: COLON & RECTAL SURGERY

## 2024-12-11 PROCEDURE — 88305 TISSUE EXAM BY PATHOLOGIST: CPT

## 2024-12-11 PROCEDURE — 2580000003 HC RX 258: Performed by: NURSE ANESTHETIST, CERTIFIED REGISTERED

## 2024-12-11 PROCEDURE — 3600007512: Performed by: COLON & RECTAL SURGERY

## 2024-12-11 PROCEDURE — 3700000001 HC ADD 15 MINUTES (ANESTHESIA): Performed by: COLON & RECTAL SURGERY

## 2024-12-11 PROCEDURE — 7100000011 HC PHASE II RECOVERY - ADDTL 15 MIN: Performed by: COLON & RECTAL SURGERY

## 2024-12-11 PROCEDURE — 3600007502: Performed by: COLON & RECTAL SURGERY

## 2024-12-11 DEVICE — WORKING LENGTH 235CM, WORKING CHANNEL 2.8MM
Type: IMPLANTABLE DEVICE | Site: ASCENDING COLON | Status: FUNCTIONAL
Brand: RESOLUTION 360 CLIP

## 2024-12-11 RX ORDER — SODIUM CHLORIDE 0.9 % (FLUSH) 0.9 %
5-40 SYRINGE (ML) INJECTION EVERY 12 HOURS SCHEDULED
Status: DISCONTINUED | OUTPATIENT
Start: 2024-12-11 | End: 2024-12-11 | Stop reason: HOSPADM

## 2024-12-11 RX ORDER — 0.9 % SODIUM CHLORIDE 0.9 %
INTRAVENOUS SOLUTION INTRAVENOUS
Status: DISCONTINUED | OUTPATIENT
Start: 2024-12-11 | End: 2024-12-11 | Stop reason: SDUPTHER

## 2024-12-11 RX ORDER — SODIUM CHLORIDE 9 MG/ML
INJECTION, SOLUTION INTRAVENOUS CONTINUOUS
Status: DISCONTINUED | OUTPATIENT
Start: 2024-12-11 | End: 2024-12-11 | Stop reason: HOSPADM

## 2024-12-11 RX ORDER — SODIUM CHLORIDE 0.9 % (FLUSH) 0.9 %
5-40 SYRINGE (ML) INJECTION PRN
Status: DISCONTINUED | OUTPATIENT
Start: 2024-12-11 | End: 2024-12-11 | Stop reason: HOSPADM

## 2024-12-11 RX ORDER — SODIUM CHLORIDE 9 MG/ML
INJECTION, SOLUTION INTRAVENOUS PRN
Status: DISCONTINUED | OUTPATIENT
Start: 2024-12-11 | End: 2024-12-11 | Stop reason: HOSPADM

## 2024-12-11 RX ORDER — LIDOCAINE HYDROCHLORIDE 20 MG/ML
INJECTION, SOLUTION EPIDURAL; INFILTRATION; INTRACAUDAL; PERINEURAL
Status: DISCONTINUED | OUTPATIENT
Start: 2024-12-11 | End: 2024-12-11 | Stop reason: SDUPTHER

## 2024-12-11 RX ADMIN — PROPOFOL 30 MG: 10 INJECTION, EMULSION INTRAVENOUS at 09:24

## 2024-12-11 RX ADMIN — PROPOFOL 25 MG: 10 INJECTION, EMULSION INTRAVENOUS at 09:30

## 2024-12-11 RX ADMIN — LIDOCAINE HYDROCHLORIDE 40 MG: 20 INJECTION, SOLUTION EPIDURAL; INFILTRATION; INTRACAUDAL; PERINEURAL at 09:22

## 2024-12-11 RX ADMIN — PROPOFOL 25 MG: 10 INJECTION, EMULSION INTRAVENOUS at 09:46

## 2024-12-11 RX ADMIN — PROPOFOL 25 MG: 10 INJECTION, EMULSION INTRAVENOUS at 09:43

## 2024-12-11 RX ADMIN — PROPOFOL 25 MG: 10 INJECTION, EMULSION INTRAVENOUS at 09:32

## 2024-12-11 RX ADMIN — PROPOFOL 30 MG: 10 INJECTION, EMULSION INTRAVENOUS at 10:01

## 2024-12-11 RX ADMIN — PROPOFOL 50 MG: 10 INJECTION, EMULSION INTRAVENOUS at 10:05

## 2024-12-11 RX ADMIN — PROPOFOL 40 MG: 10 INJECTION, EMULSION INTRAVENOUS at 09:54

## 2024-12-11 RX ADMIN — SODIUM CHLORIDE 250 ML: 9 INJECTION, SOLUTION INTRAVENOUS at 09:22

## 2024-12-11 RX ADMIN — PROPOFOL 25 MG: 10 INJECTION, EMULSION INTRAVENOUS at 09:34

## 2024-12-11 RX ADMIN — PROPOFOL 30 MG: 10 INJECTION, EMULSION INTRAVENOUS at 09:58

## 2024-12-11 RX ADMIN — PROPOFOL 120 MG: 10 INJECTION, EMULSION INTRAVENOUS at 09:22

## 2024-12-11 RX ADMIN — PROPOFOL 25 MG: 10 INJECTION, EMULSION INTRAVENOUS at 09:27

## 2024-12-11 ASSESSMENT — PAIN - FUNCTIONAL ASSESSMENT: PAIN_FUNCTIONAL_ASSESSMENT: 0-10

## 2024-12-11 ASSESSMENT — LIFESTYLE VARIABLES: SMOKING_STATUS: 1

## 2024-12-11 NOTE — H&P
[place] UNLISTED PROCEDURE ABDOMEN PERITONEUM & OMENTUM  03/2016    hernia repair       Family History   Problem Relation Age of Onset    Cancer Mother     Breast Cancer Mother     Colon Cancer Mother 82    No Known Problems Father      Social History     Socioeconomic History    Marital status:      Spouse name: Not on file    Number of children: Not on file    Years of education: Not on file    Highest education level: Not on file   Occupational History    Not on file   Tobacco Use    Smoking status: Every Day     Current packs/day: 1.00     Types: Cigarettes    Smokeless tobacco: Never   Substance and Sexual Activity    Alcohol use: No    Drug use: No    Sexual activity: Not on file   Other Topics Concern    Not on file   Social History Narrative    Not on file     Social Determinants of Health     Financial Resource Strain: Not on file   Food Insecurity: Not on file   Transportation Needs: Not on file   Physical Activity: Not on file   Stress: Not on file   Social Connections: Not on file   Intimate Partner Violence: Not on file   Housing Stability: Not on file       Allergies: No Known Allergies    Current Medications:  Cannot display prior to admission medications because the patient has not been admitted in this contact.       No current facility-administered medications for this encounter.     Current Outpatient Medications   Medication Sig    simvastatin (ZOCOR) 40 MG tablet TAKE 1 TABLET NIGHTLY    felodipine (PLENDIL) 5 MG extended release tablet TAKE 1 TABLET DAILY    SYNTHROID 137 MCG tablet Take 1 tablet by mouth Daily            Physical Exam:  There were no vitals taken for this visit.  GENERAL:  No apparent distress.  LUNGS:  Clear to auscultation bilaterally.  HEART:  Regular rate and rhythm with no murmurs, gallops, or rubs.  ABDOMEN: Soft, non-tender, and non-distended.  NEUROLOGIC: Alert and oriented.  No gross deficits.      Alerts:      Laboratory:    No results for input(s): \"HGB\",

## 2024-12-11 NOTE — PROGRESS NOTES
Initial RN admission and assessment performed and documented in Endoscopy navigator.     Patient evaluated by anesthesia in pre-procedure holding.     All procedural vital signs, airway assessment, and level of consciousness information monitored and recorded by anesthesia staff on the anesthesia record.     Report received from CRNA post procedure.  Patient transported to recovery area by RN.    Endoscopy post procedure time out was performed and specimens were verified with physician.    Endoscope was pre-cleaned at bedside immediately following procedure by DENICE CORTEZ.

## 2024-12-11 NOTE — ANESTHESIA POSTPROCEDURE EVALUATION
Department of Anesthesiology  Postprocedure Note    Patient: Ky Richards  MRN: [id number]  YOB: 1955  Date of evaluation: 12/11/2024    Procedure Summary       Date: 12/11/24 Room / Location: [facility] ENDO 03 / [facility] ENDOSCOPY    Anesthesia Start: 0922 Anesthesia Stop: 1010    Procedure: COLONOSCOPY DIAGNOSTIC Diagnosis:       Screen for colon cancer      Family history of colon cancer      History of colonic polyps      (Screen for colon cancer [Z12.11])      (Family history of colon cancer [Z80.0])      (History of colonic polyps [Z86.0100])    Surgeons: Abel Cohn MD Responsible Provider: Sergio Haynes MD    Anesthesia Type: MAC ASA Status: 3            Anesthesia Type: No value filed.    Og Phase I: Og Score: 9    Og Phase II: Og Score: 10    Anesthesia Post Evaluation    Patient location during evaluation: PACU  Patient participation: complete - patient participated  Level of consciousness: awake and alert  Airway patency: patent  Nausea & Vomiting: no nausea and no vomiting  Cardiovascular status: hemodynamically stable  Respiratory status: acceptable  Hydration status: stable    No notable events documented.

## 2024-12-11 NOTE — OP NOTE
the appendiceal orifice remnant that was biopsied with the hot forceps but not necessarily completely removed.  There was a flat 16 cm polyp locate in the ascending colon.     Procedure Details:  After informed consent was obtained, the patient was taken to the endoscopy suite where standard monitoring devices were attached and intravenous access was established.  Sedation was administered by the anesthetist as needed throughout the procedure.  The patient was placed in the left lateral decubitus position, and inspection of the perineum revealed no significant external lesions.  Digital rectal examination revealed no masses.    The Olympus videocolonoscope was lubricated and inserted transanally into the rectum. It was advanced into the colon and without difficulty to the cecum, which was identified by the presence of the ileocecal valve and the appendiceal orifice remnant.  The quality of the bowel preparation was good, as was the overall visualization.  Careful inspection was performed during withdrawal of the colonoscope.  The tissue at the appendiceal orifice remnant was biopsied using the hot forceps.  The diminutive polyps noted above were excised using the hot forceps.  The 16 mm ascending colon polyp was lifted by submucosal injection of Blue Boost then excised suing three deployments of the hot hexagonal snare.  One Resolution 360 clip was then used to close the resulting defect.   There were no apparent complications, and the patient appeared to have tolerated the procedure well.  At its conclusion, he was transported to the recovery area in good condition.    Impression:    The polyps appeared to have been benign.    Recommendations:  I will contact the patient with the pathology results and make a follow-up recommendation at that time.

## 2024-12-11 NOTE — ANESTHESIA PRE PROCEDURE
Procedure Laterality Date   • APPENDECTOMY  1980's   • COLONOSCOPY N/A 10/05/2016    COLONOSCOPY performed by Abel Cohn MD at [facility] ENDOSCOPY   • COLONOSCOPY  09/06/2011    multiple tubular adenomas were excised.   • HEENT  1970's    surgery for broken cheek bone and broken nose   • HEENT      tonsillectomy   • ORTHOPEDIC SURGERY      ORIF right ankle with ligament reattachment   • OTHER SURGICAL HISTORY      surgery for basal cell skin cancer x 2. One was done in MD office and one in a surgical setting   • [place] UNLISTED PROCEDURE ABDOMEN PERITONEUM & OMENTUM  03/2016    hernia repair       Social History:    Social History     Tobacco Use   • Smoking status: Every Day     Current packs/day: 1.00     Types: Cigarettes   • Smokeless tobacco: Never   Substance Use Topics   • Alcohol use: No                                Ready to quit: Not Answered  Counseling given: Not Answered      Vital Signs (Current): There were no vitals filed for this visit.                                           BP Readings from Last 3 Encounters:   09/28/22 138/76   03/30/22 123/87   09/29/21 128/84       NPO Status:                                                                                 BMI:   Wt Readings from Last 3 Encounters:   09/28/22 97.5 kg (215 lb)   03/30/22 98.4 kg (217 lb)   09/29/21 97.1 kg (214 lb)     There is no height or weight on file to calculate BMI.    CBC:   Lab Results   Component Value Date/Time    WBC 9.1 09/19/2022 07:12 AM    RBC 5.39 09/19/2022 07:12 AM    HGB 16.6 09/19/2022 07:12 AM    HCT 48.6 09/19/2022 07:12 AM    MCV 90 09/19/2022 07:12 AM    RDW 13.1 09/19/2022 07:12 AM     09/19/2022 07:12 AM       CMP:   Lab Results   Component Value Date/Time     04/20/2023 07:22 AM    K 3.9 04/20/2023 07:22 AM     04/20/2023 07:22 AM    CO2 25 04/20/2023 07:22 AM    BUN 10 04/20/2023 07:22 AM    CREATININE 0.87 04/20/2023 07:22 AM    GFRAA 105 09/24/2021 10:04 AM    AGRATIO 2.0

## 2024-12-11 NOTE — DISCHARGE INSTRUCTIONS
[facility]UAN [facility]inch[facility].  [address]  [place] [address] [phone]      Post-Colonoscopy Instructions    Do not drive, operate dangerous machinery, sign legal documents, or conduct any important business for the rest of the day.    Avoid strenuous exercise for the next 10 days.    Avoid straining when you move your bowels.    Do not take any aspirin, aspirin-containing products, ibuprofen (Advil, Motrin, etc.), or Aleve for the next two weeks.  You may take Tylenol as directed on the bottle if needed.    You may begin with a light diet today then advance to your usual diet as tolerated.  Do not drink alcohol for the rest of the day.    If you notice blood in your stool for more than one or two bowel movements following the procedure, if you develop abdominal pain (aside from gas cramps on the day of the procedure), or if you develop a fever with a temperature of 101.0 or higher, call my office.    If you do not have a bowel movement within the next two days, call my office.    If you have any other problems or questions, please call my office.    Findings and Follow-up:  There was no cancer.  There were 7  polyps that we removed, and the diverticulosis was of course seen.  You should consume high fiber diet.  I will discuss the pathology results with you when they are available, and I will make a follow-up recommendation at that time.  Please call my office if you have not heard from me by Monday 12/16/2024.

## 2025-07-09 ENCOUNTER — APPOINTMENT (OUTPATIENT)
Age: 70
Setting detail: DERMATOLOGY
End: 2025-07-09

## 2025-07-09 DIAGNOSIS — L57.0 ACTINIC KERATOSIS: ICD-10-CM | Status: INADEQUATELY CONTROLLED

## 2025-07-09 DIAGNOSIS — Z71.89 OTHER SPECIFIED COUNSELING: ICD-10-CM

## 2025-07-09 DIAGNOSIS — L82.1 OTHER SEBORRHEIC KERATOSIS: ICD-10-CM

## 2025-07-09 PROBLEM — D48.5 NEOPLASM OF UNCERTAIN BEHAVIOR OF SKIN: Status: ACTIVE | Noted: 2025-07-09

## 2025-07-09 PROCEDURE — ? SUNSCREEN RECOMMENDATIONS

## 2025-07-09 PROCEDURE — ? BIOPSY BY SHAVE METHOD

## 2025-07-09 PROCEDURE — ? LIQUID NITROGEN

## 2025-07-09 PROCEDURE — ? COUNSELING

## 2025-07-09 ASSESSMENT — LOCATION DETAILED DESCRIPTION DERM
LOCATION DETAILED: NASAL SUPRATIP
LOCATION DETAILED: RIGHT INFERIOR CENTRAL MALAR CHEEK
LOCATION DETAILED: RIGHT ULNAR DORSAL HAND
LOCATION DETAILED: RIGHT SUPERIOR UPPER BACK
LOCATION DETAILED: LEFT NASAL ALA
LOCATION DETAILED: RIGHT MID-UPPER BACK
LOCATION DETAILED: LEFT MEDIAL EYEBROW
LOCATION DETAILED: LEFT FOREHEAD
LOCATION DETAILED: LEFT INFERIOR FOREHEAD
LOCATION DETAILED: LEFT SUPERIOR UPPER BACK
LOCATION DETAILED: LEFT MID-UPPER BACK
LOCATION DETAILED: LEFT RADIAL DORSAL HAND
LOCATION DETAILED: RIGHT NASAL SIDEWALL
LOCATION DETAILED: LEFT DORSAL WRIST

## 2025-07-09 ASSESSMENT — LOCATION SIMPLE DESCRIPTION DERM
LOCATION SIMPLE: RIGHT CHEEK
LOCATION SIMPLE: LEFT WRIST
LOCATION SIMPLE: RIGHT HAND
LOCATION SIMPLE: RIGHT UPPER BACK
LOCATION SIMPLE: LEFT FOREHEAD
LOCATION SIMPLE: RIGHT NOSE
LOCATION SIMPLE: NOSE
LOCATION SIMPLE: LEFT EYEBROW
LOCATION SIMPLE: LEFT UPPER BACK
LOCATION SIMPLE: LEFT HAND
LOCATION SIMPLE: LEFT NOSE

## 2025-07-09 ASSESSMENT — LOCATION ZONE DERM
LOCATION ZONE: NOSE
LOCATION ZONE: ARM
LOCATION ZONE: FACE
LOCATION ZONE: HAND
LOCATION ZONE: TRUNK

## 2025-07-09 ASSESSMENT — PAIN INTENSITY VAS: HOW INTENSE IS YOUR PAIN 0 BEING NO PAIN, 10 BEING THE MOST SEVERE PAIN POSSIBLE?: NO PAIN

## 2025-07-09 NOTE — PROCEDURE: MIPS QUALITY
Additional Notes: I counseled patient regarding importance for smoking cessation. 
Detail Level: Detailed
Quality 226: Preventive Care And Screening: Tobacco Use: Screening And Cessation Intervention: Patient screened for tobacco use, is a smoker AND received Cessation Counseling within measurement period or in the six months prior to the measurement period
Quality 130: Documentation Of Current Medications In The Medical Record: Current Medications Documented

## 2025-07-09 NOTE — PROCEDURE: BIOPSY BY SHAVE METHOD
Detail Level: Detailed
Depth Of Biopsy: dermis
Was A Bandage Applied: Yes
Size Of Lesion In Cm: 0
Biopsy Type: H and E
Biopsy Method: Dermablade
Anesthesia Type: 1% lidocaine with epinephrine
Anesthesia Volume In Cc: 0.5
Hemostasis: Drysol
Wound Care: Petrolatum
Dressing: bandage
Destruction After The Procedure: No
Type Of Destruction Used: Curettage
Curettage Text: The wound bed was treated with curettage after the biopsy was performed.
Cryotherapy Text: The wound bed was treated with cryotherapy after the biopsy was performed.
Electrodesiccation Text: The wound bed was treated with electrodesiccation after the biopsy was performed.
Electrodesiccation And Curettage Text: The wound bed was treated with electrodesiccation and curettage after the biopsy was performed.
Silver Nitrate Text: The wound bed was treated with silver nitrate after the biopsy was performed.
Lab: -[id number]
Medical Necessity Information: It is in your best interest to select a reason for this procedure from the list below. All of these items fulfill various CMS LCD requirements except the new and changing color options.
Consent: Written consent was obtained and risks were reviewed including but not limited to scarring, infection, bleeding, scabbing, incomplete removal, nerve damage and allergy to anesthesia.
Post-Care Instructions: I reviewed with the patient in detail post-care instructions. Patient is to keep the biopsy site dry overnight, and then apply bacitracin twice daily until healed. Patient may apply hydrogen peroxide soaks to remove any crusting.
Notification Instructions: Patient will be notified of biopsy results. However, patient instructed to call the office if not contacted within 2 weeks.
Billing Type: Third-Party Bill
Information: Selecting Yes will display possible errors in your note based on the variables you have selected. This validation is only offered as a suggestion for you. PLEASE NOTE THAT THE VALIDATION TEXT WILL BE REMOVED WHEN YOU FINALIZE YOUR NOTE. IF YOU WANT TO FAX A PRELIMINARY NOTE YOU WILL NEED TO TOGGLE THIS TO 'NO' IF YOU DO NOT WANT IT IN YOUR FAXED NOTE.

## 2025-07-14 ENCOUNTER — RX ONLY (RX ONLY)
Age: 70
End: 2025-07-14

## 2025-07-14 RX ORDER — TRIAMCINOLONE ACETONIDE 0.25 MG/G
CREAM TOPICAL
Qty: 15 | Refills: 0 | Status: CANCELLED

## 2025-07-14 RX ORDER — TRIAMCINOLONE ACETONIDE 1 MG/G
APPLY CREAM TOPICAL BID
Qty: 80 | Refills: 2 | Status: ERX | COMMUNITY
Start: 2025-07-14

## 2025-07-23 ENCOUNTER — APPOINTMENT (OUTPATIENT)
Age: 70
Setting detail: DERMATOLOGY
End: 2025-07-23

## 2025-07-23 PROBLEM — C44.719 BASAL CELL CARCINOMA OF SKIN OF LEFT LOWER LIMB, INCLUDING HIP: Status: ACTIVE | Noted: 2025-07-23

## 2025-07-23 PROCEDURE — ? CURETTAGE

## 2025-07-23 NOTE — PROCEDURE: CURETTAGE
Detail Level: Detailed
Size Of Lesion In Cm: 0.8
Size Of Lesion After Curettage: 1.4
Anesthesia Type: 1% lidocaine with epinephrine
Cautery Type: electrodesiccation
Number Of Curettages: 3
Additional Information: (Optional): The wound was cleaned, and a pressure dressing was applied.  The patient received detailed post-op instructions.
Consent was obtained from the patient. The risks, benefits and alternatives to therapy were discussed in detail. Specifically, the risks of infection, scarring, bleeding, prolonged wound healing, nerve injury, incomplete removal, allergy to anesthesia and recurrence were addressed. Alternatives to ED&C, such as: surgical removal and XRT were also discussed.  Prior to the procedure, the treatment site was clearly identified and confirmed by the patient. All components of Universal Protocol/PAUSE Rule completed.
Render Post-Care Instructions In Note?: no
Post-Care Instructions: I reviewed with the patient in detail post-care instructions. Patient is to keep the area dry for 48 hours, and not to engage in any swimming until the area is healed. Should the patient develop any fevers, chills, bleeding, severe pain patient will contact the office immediately.
Bill As A Line Item Or As Units: Line Item

## (undated) DEVICE — SINGLE-USE POLYPECTOMY SNARE: Brand: CAPTIVATOR

## (undated) DEVICE — ELECTRODE PT RET AD L9FT HI MOIST COND ADH HYDRGEL CORDED

## (undated) DEVICE — TRAP SURG QUAD PARABOLA SLOT DSGN SFTY SCRN TRAPEASE

## (undated) DEVICE — FORCEPS BX L L240CM DIA2.4MM RAD JAW 4 HOT FOR POLYP DISP

## (undated) DEVICE — SUPPLEMENT DIGESTIVE H2O SOL GI-EASE

## (undated) DEVICE — AGENT LIFTING 10 CC SUBMUCOSAL INJ SOLUTION STRL BLUEBOOST

## (undated) DEVICE — Device: Brand: SINGLE USE INJECTOR NM600/610